# Patient Record
Sex: FEMALE | Race: WHITE | NOT HISPANIC OR LATINO | Employment: FULL TIME | URBAN - METROPOLITAN AREA
[De-identification: names, ages, dates, MRNs, and addresses within clinical notes are randomized per-mention and may not be internally consistent; named-entity substitution may affect disease eponyms.]

---

## 2022-04-10 ENCOUNTER — HISTORICAL (OUTPATIENT)
Dept: ADMINISTRATIVE | Facility: HOSPITAL | Age: 27
End: 2022-04-10

## 2022-04-25 VITALS
DIASTOLIC BLOOD PRESSURE: 84 MMHG | BODY MASS INDEX: 44.96 KG/M2 | HEIGHT: 66 IN | WEIGHT: 279.75 LBS | SYSTOLIC BLOOD PRESSURE: 120 MMHG

## 2022-11-10 ENCOUNTER — TELEPHONE (OUTPATIENT)
Dept: NEUROLOGY | Facility: CLINIC | Age: 27
End: 2022-11-10
Payer: COMMERCIAL

## 2022-11-10 DIAGNOSIS — G93.2 BENIGN INTRACRANIAL HYPERTENSION: Primary | ICD-10-CM

## 2022-11-10 NOTE — TELEPHONE ENCOUNTER
Pt reports her optometrist sent her to ER after speaking to on call MD when calling clinic after hours yesterday 11/9/22.  States she was sent to ER due to optic nerve in her eye.   Is asking if Dr. Venegas has any advice for her.       LOV: 11/25/2019    NOV: none  Notified MD's next available appt is out to January of 2023.  Notified this would be after 3 year sukh of not being seen and a referral would be needed to be seen as new pt if not seen by 11/25/2022.   Notified new pt availability out to February 2023.

## 2022-11-10 NOTE — TELEPHONE ENCOUNTER
Ladi Bernal of Pseudotumor cerebri, papilledema, ICP  Please work in as this concerns her vision  If no emergency spots avail, I believe MD was opening template 11/17 & 11/18 to accommodate additional patients

## 2022-11-11 ENCOUNTER — HOSPITAL ENCOUNTER (OUTPATIENT)
Dept: RADIOLOGY | Facility: HOSPITAL | Age: 27
Discharge: HOME | End: 2022-11-11
Attending: OPTOMETRIST
Payer: COMMERCIAL

## 2022-11-11 VITALS — HEART RATE: 66 BPM | SYSTOLIC BLOOD PRESSURE: 108 MMHG | DIASTOLIC BLOOD PRESSURE: 75 MMHG | OXYGEN SATURATION: 98 %

## 2022-11-11 DIAGNOSIS — G93.2 BENIGN INTRACRANIAL HYPERTENSION: ICD-10-CM

## 2022-11-11 LAB
POC PTINR: 1.1 (ref 0.9–1.2)
POC PTWBT: 13.3 SEC (ref 9.7–14.3)
SAMPLE: NORMAL

## 2022-11-11 PROCEDURE — 62329 THER SPI PNXR CSF FLUOR/CT: CPT

## 2022-11-11 NOTE — DISCHARGE INSTRUCTIONS
Report to ER with any new onset headache, light sensitivity, numbness/tingling in lower extremities or drainage from puncture site.

## 2022-11-16 VITALS
HEIGHT: 66 IN | SYSTOLIC BLOOD PRESSURE: 120 MMHG | BODY MASS INDEX: 44.96 KG/M2 | WEIGHT: 279.75 LBS | DIASTOLIC BLOOD PRESSURE: 84 MMHG

## 2022-11-16 RX ORDER — PREDNISONE 20 MG/1
20 TABLET ORAL DAILY
COMMUNITY
Start: 2022-11-01 | End: 2022-11-17

## 2022-11-16 RX ORDER — OXYCODONE AND ACETAMINOPHEN 5; 325 MG/1; MG/1
1-2 TABLET ORAL
COMMUNITY
End: 2022-11-17

## 2022-11-16 RX ORDER — ESOMEPRAZOLE MAGNESIUM 40 MG/1
1 CAPSULE, DELAYED RELEASE ORAL
COMMUNITY
End: 2022-11-17

## 2022-11-16 RX ORDER — IBUPROFEN 600 MG/1
TABLET ORAL
COMMUNITY
End: 2022-11-17

## 2022-11-16 RX ORDER — DIAZEPAM 5 MG/1
5 TABLET ORAL DAILY PRN
COMMUNITY
Start: 2022-11-10 | End: 2022-11-17

## 2022-11-16 RX ORDER — LACTULOSE 10 G/15ML
SOLUTION ORAL; RECTAL
COMMUNITY
End: 2022-11-17

## 2022-11-16 RX ORDER — HYDROXYZINE HYDROCHLORIDE 10 MG/1
1 TABLET, FILM COATED ORAL
COMMUNITY
End: 2022-11-17

## 2022-11-17 ENCOUNTER — OFFICE VISIT (OUTPATIENT)
Dept: NEUROLOGY | Facility: CLINIC | Age: 27
End: 2022-11-17
Payer: COMMERCIAL

## 2022-11-17 VITALS
BODY MASS INDEX: 44.84 KG/M2 | DIASTOLIC BLOOD PRESSURE: 98 MMHG | HEIGHT: 66 IN | HEART RATE: 74 BPM | SYSTOLIC BLOOD PRESSURE: 137 MMHG | WEIGHT: 279 LBS

## 2022-11-17 DIAGNOSIS — G93.2 PSEUDOTUMOR: Primary | ICD-10-CM

## 2022-11-17 DIAGNOSIS — G47.33 OSA (OBSTRUCTIVE SLEEP APNEA): ICD-10-CM

## 2022-11-17 PROCEDURE — 99215 PR OFFICE/OUTPT VISIT, EST, LEVL V, 40-54 MIN: ICD-10-PCS | Mod: S$GLB,,, | Performed by: PSYCHIATRY & NEUROLOGY

## 2022-11-17 PROCEDURE — 99215 OFFICE O/P EST HI 40 MIN: CPT | Mod: S$GLB,,, | Performed by: PSYCHIATRY & NEUROLOGY

## 2022-11-17 PROCEDURE — 99999 PR PBB SHADOW E&M-EST. PATIENT-LVL IV: CPT | Mod: PBBFAC,,, | Performed by: PSYCHIATRY & NEUROLOGY

## 2022-11-17 PROCEDURE — 3080F DIAST BP >= 90 MM HG: CPT | Mod: CPTII,S$GLB,, | Performed by: PSYCHIATRY & NEUROLOGY

## 2022-11-17 PROCEDURE — 1159F PR MEDICATION LIST DOCUMENTED IN MEDICAL RECORD: ICD-10-PCS | Mod: CPTII,S$GLB,, | Performed by: PSYCHIATRY & NEUROLOGY

## 2022-11-17 PROCEDURE — 3008F PR BODY MASS INDEX (BMI) DOCUMENTED: ICD-10-PCS | Mod: CPTII,S$GLB,, | Performed by: PSYCHIATRY & NEUROLOGY

## 2022-11-17 PROCEDURE — 3075F PR MOST RECENT SYSTOLIC BLOOD PRESS GE 130-139MM HG: ICD-10-PCS | Mod: CPTII,S$GLB,, | Performed by: PSYCHIATRY & NEUROLOGY

## 2022-11-17 PROCEDURE — 1159F MED LIST DOCD IN RCRD: CPT | Mod: CPTII,S$GLB,, | Performed by: PSYCHIATRY & NEUROLOGY

## 2022-11-17 PROCEDURE — 3080F PR MOST RECENT DIASTOLIC BLOOD PRESSURE >= 90 MM HG: ICD-10-PCS | Mod: CPTII,S$GLB,, | Performed by: PSYCHIATRY & NEUROLOGY

## 2022-11-17 PROCEDURE — 3075F SYST BP GE 130 - 139MM HG: CPT | Mod: CPTII,S$GLB,, | Performed by: PSYCHIATRY & NEUROLOGY

## 2022-11-17 PROCEDURE — 3008F BODY MASS INDEX DOCD: CPT | Mod: CPTII,S$GLB,, | Performed by: PSYCHIATRY & NEUROLOGY

## 2022-11-17 PROCEDURE — 99999 PR PBB SHADOW E&M-EST. PATIENT-LVL IV: ICD-10-PCS | Mod: PBBFAC,,, | Performed by: PSYCHIATRY & NEUROLOGY

## 2022-11-17 RX ORDER — TOPIRAMATE 50 MG/1
50 TABLET, FILM COATED ORAL NIGHTLY
Qty: 30 TABLET | Refills: 11 | Status: SHIPPED | OUTPATIENT
Start: 2022-11-17 | End: 2023-02-02

## 2022-11-17 RX ORDER — FUROSEMIDE 20 MG/1
20 TABLET ORAL DAILY
Qty: 30 TABLET | Refills: 11 | Status: SHIPPED | OUTPATIENT
Start: 2022-11-17 | End: 2022-12-21 | Stop reason: ALTCHOICE

## 2022-11-17 NOTE — PROGRESS NOTES
Subjective:       Patient ID: Perla Vu is a 27 y.o. female.    Chief Complaint: Pseudotumor Cerebri (Last seen 11/25/2019) and Papilledema (CT done 11/10/2022, LP 11/11/2022/C/o of pupils were two sizes last week)    HPI    Known pseudotumor  Last seen 2019  Sx remitted somewhat after delivery  Presnets with stable visoin sx; loss of L inf nasal field per ophth; worsening headaches over the last several weeks  Saw oph in [place]; we arranged LP last Friday; OP was 29; CP was 21; headache better but is coming back  Intolerant of diamox  Had BTL after her delivery  Review of Systems    The remainder of the 14 system ROS is noncontributory or negative unless mentioned/reviewed above.    Objective:      Physical Exam  L disc grade 2; R disc grade 1 edema  Mental Status: Alert and oriented x3. Language is fluent with good comprehension.    Cranial Nerve: Pupils are equal, round, and reactive to light. Visual fields are intact to confrontation. Normal fundi. Ocular movements are intact. Face is symmetric at rest and with activation with intact sensation throughout. Hearing intact to finger rub bilaterally. Muscles of tongue and palate activate symmetrically. No dysarthria. Strength is full in sternocleidomastoid and trapezius bilaterally.    Motor: Muscle bulk and tone are normal. Strength is 5/5 in all four extremities both proximally and distally. Intact fine motor movements bilaterally. There is no pronator drift or satelliting on arm roll.    Sensory: Sensation is intact to light touch, pinprick, vibration, and proprioception throughout. Romberg is negative.    Reflexes: 2+ and symmetric at the biceps, triceps, brachioradialis, patella, and Achilles bilaterally. Plantar response is flexor bilaterally.    Coordination: No dysmetria on finger-nose-finger or heel-knee-shin. Normal rapid alternating movements. Fast finger tapping with normal amplitude and speed.    Gait: Narrow based with normal stride length  and good arm swing bilaterally. Able to walk on heels, toes, and in tandem.    Assessment:       Problem List Items Addressed This Visit    None  Visit Diagnoses       Pseudotumor    -  Primary    Relevant Orders    Ambulatory referral/consult to Bariatric Surgery    Home Sleep Study    CATRACHO (obstructive sleep apnea)        Relevant Orders    Ambulatory referral/consult to Bariatric Surgery    Home Sleep Study              Plan:       Wt loss discussed at length; recc either optavia or diet/exercise; requests bariatric referral ; placed  HST ordered  Try lasix/topamax; no h/o glaucoma/kidney stone  Rtc 3-4 weeks

## 2022-11-22 ENCOUNTER — TELEPHONE (OUTPATIENT)
Dept: OPHTHALMOLOGY | Facility: CLINIC | Age: 27
End: 2022-11-22
Payer: COMMERCIAL

## 2022-11-22 NOTE — TELEPHONE ENCOUNTER
----- Message from Layla Musa sent at 11/22/2022  1:38 PM CST -----  Regarding: Referral  Contact: Lyric coelho is calling to found out status of referral.    [phone]

## 2022-11-22 NOTE — TELEPHONE ENCOUNTER
----- Message from Elsie Dickerson sent at 11/22/2022  3:12 PM CST -----  Patient is returning call to schedule appt.  Please contact her at [phone].

## 2022-12-21 ENCOUNTER — OFFICE VISIT (OUTPATIENT)
Dept: NEUROLOGY | Facility: CLINIC | Age: 27
End: 2022-12-21
Payer: COMMERCIAL

## 2022-12-21 VITALS
BODY MASS INDEX: 44.84 KG/M2 | SYSTOLIC BLOOD PRESSURE: 104 MMHG | HEIGHT: 66 IN | DIASTOLIC BLOOD PRESSURE: 64 MMHG | WEIGHT: 279 LBS

## 2022-12-21 DIAGNOSIS — G93.2 PSEUDOTUMOR: Primary | ICD-10-CM

## 2022-12-21 DIAGNOSIS — G47.33 OBSTRUCTIVE SLEEP APNEA: ICD-10-CM

## 2022-12-21 DIAGNOSIS — G43.909 MIGRAINE WITHOUT STATUS MIGRAINOSUS, NOT INTRACTABLE, UNSPECIFIED MIGRAINE TYPE: ICD-10-CM

## 2022-12-21 PROCEDURE — 3008F BODY MASS INDEX DOCD: CPT | Mod: CPTII,S$GLB,, | Performed by: PSYCHIATRY & NEUROLOGY

## 2022-12-21 PROCEDURE — 1159F PR MEDICATION LIST DOCUMENTED IN MEDICAL RECORD: ICD-10-PCS | Mod: CPTII,S$GLB,, | Performed by: PSYCHIATRY & NEUROLOGY

## 2022-12-21 PROCEDURE — 99999 PR PBB SHADOW E&M-EST. PATIENT-LVL III: ICD-10-PCS | Mod: PBBFAC,,, | Performed by: PSYCHIATRY & NEUROLOGY

## 2022-12-21 PROCEDURE — 3074F PR MOST RECENT SYSTOLIC BLOOD PRESSURE < 130 MM HG: ICD-10-PCS | Mod: CPTII,S$GLB,, | Performed by: PSYCHIATRY & NEUROLOGY

## 2022-12-21 PROCEDURE — 99214 PR OFFICE/OUTPT VISIT, EST, LEVL IV, 30-39 MIN: ICD-10-PCS | Mod: S$GLB,,, | Performed by: PSYCHIATRY & NEUROLOGY

## 2022-12-21 PROCEDURE — 99214 OFFICE O/P EST MOD 30 MIN: CPT | Mod: S$GLB,,, | Performed by: PSYCHIATRY & NEUROLOGY

## 2022-12-21 PROCEDURE — 1159F MED LIST DOCD IN RCRD: CPT | Mod: CPTII,S$GLB,, | Performed by: PSYCHIATRY & NEUROLOGY

## 2022-12-21 PROCEDURE — 99999 PR PBB SHADOW E&M-EST. PATIENT-LVL III: CPT | Mod: PBBFAC,,, | Performed by: PSYCHIATRY & NEUROLOGY

## 2022-12-21 PROCEDURE — 3078F PR MOST RECENT DIASTOLIC BLOOD PRESSURE < 80 MM HG: ICD-10-PCS | Mod: CPTII,S$GLB,, | Performed by: PSYCHIATRY & NEUROLOGY

## 2022-12-21 PROCEDURE — 3008F PR BODY MASS INDEX (BMI) DOCUMENTED: ICD-10-PCS | Mod: CPTII,S$GLB,, | Performed by: PSYCHIATRY & NEUROLOGY

## 2022-12-21 PROCEDURE — 3074F SYST BP LT 130 MM HG: CPT | Mod: CPTII,S$GLB,, | Performed by: PSYCHIATRY & NEUROLOGY

## 2022-12-21 PROCEDURE — 3078F DIAST BP <80 MM HG: CPT | Mod: CPTII,S$GLB,, | Performed by: PSYCHIATRY & NEUROLOGY

## 2022-12-21 RX ORDER — SUMATRIPTAN 50 MG/1
50 TABLET, FILM COATED ORAL
Qty: 9 TABLET | Refills: 11 | Status: SHIPPED | OUTPATIENT
Start: 2022-12-21 | End: 2023-01-20

## 2022-12-21 RX ORDER — ACETAZOLAMIDE 125 MG/1
125 TABLET ORAL DAILY
Qty: 30 TABLET | Refills: 11 | Status: SHIPPED | OUTPATIENT
Start: 2022-12-21 | End: 2023-02-02

## 2022-12-21 NOTE — PROGRESS NOTES
Subjective:       Patient ID: Perla Vu is a 27 y.o. female.    Chief Complaint: Pseudotumor Cerebri    HPI  Saw ophtho yesterday; edema is stable  Does not tolerate topamax  Lasix not really helping  Gets 2 migraines/week  No more constant headache  Review of Systems    The remainder of the 14 system ROS is noncontributory or negative unless mentioned/reviewed above.    Objective:      Physical Exam  grade 1-2 disc edema bilaterally  Mental Status: Alert and oriented x3. Language is fluent with good comprehension.    Cranial Nerve: Pupils are equal, round, and reactive to light. Visual fields are intact to confrontation. Normal fundi. Ocular movements are intact. Face is symmetric at rest and with activation with intact sensation throughout. Hearing intact to finger rub bilaterally. Muscles of tongue and palate activate symmetrically. No dysarthria. Strength is full in sternocleidomastoid and trapezius bilaterally.    Motor: Muscle bulk and tone are normal. Strength is 5/5 in all four extremities both proximally and distally. Intact fine motor movements bilaterally. There is no pronator drift or satelliting on arm roll.    Sensory: Sensation is intact to light touch, pinprick, vibration, and proprioception throughout. Romberg is negative.    Reflexes: 2+ and symmetric at the biceps, triceps, brachioradialis, patella, and Achilles bilaterally. Plantar response is flexor bilaterally.    Coordination: No dysmetria on finger-nose-finger or heel-knee-shin. Normal rapid alternating movements. Fast finger tapping with normal amplitude and speed.    Gait: Narrow based with normal stride length and good arm swing bilaterally. Able to walk on heels, toes, and in tandem.    Assessment:       Problem List Items Addressed This Visit    None  Visit Diagnoses       Pseudotumor    -  Primary    Migraine without status migrainosus, not intractable, unspecified migraine type        Obstructive sleep apnea        Relevant  Orders    Home Sleep Study              Plan:       Stop topamax  Restart diamox 125 mg/day  HST reordered (never heard back from prior visit order)  Sees bariatric surgery next month  Imitrex for the migraines

## 2023-02-01 NOTE — PROGRESS NOTES
"    Date:  2/2/2023    ?  Referring Provider:   Kole Guzman, OD    Copies of Letters to the Following:   Kole Guzman, OD    Chief Complaint:  I saw Perla Vu at the [facility] for neuro-ophthalmic evaluation.   She is a 27 y.o. female with a history of chronic headaches who presents for evaluation of suspected IIH..    History:     Diagnosed with IIH first during pregnancy in 2019. Neuroimaging obtained at [facility], can't recall which MRI. Started acetazolamide but discontinued after 2 weeks out of concern for possible effects on her pregnancy. Headaches recurred and she was again noted to have papilledema. She was started on topiramate but felt cognitively slow and this was discontinued. She was started on acetazolamide 125 mg daily about 2 months ago which she has tolerated well, but with some paresthesias and dysgeusia which have improved with time. She feels minimally improved but continues to have pressure like throbbing headaches over her vertex and occiput. She has pulsatile tinnitus, but no diplopia.      LP in 11/2022 with OP of 28 cm H2O    12/21/2022 Venegas (neuro):  "Stop topamax  Restart diamox 125 mg/day  HST reordered (never heard back from prior visit order)  Sees bariatric surgery next month  Imitrex for the migraines  "    11/17/2022 Venegas:  "Known pseudotumor  Last seen 2019  Sx remitted somewhat after delivery  Presnets with stable visoin sx; loss of L inf nasal field per ophth; worsening headaches over the last several weeks  Saw oph in [place]; we arranged LP last Friday; OP was 29; CP was 21; headache better but is coming back  Intolerant of diamox  Had BTL after her delivery"  ?  Current Outpatient Medications   Medication Sig Dispense Refill    acetaZOLAMIDE (DIAMOX) 250 MG tablet Take 2 tablets (500 mg total) by mouth 2 (two) times daily. 120 tablet 11    sumatriptan (IMITREX) 50 MG tablet Take 1 tablet (50 mg total) by mouth every 2 (two) hours " as needed for Migraine (max 200 mg/day). 9 tablet 11     No current facility-administered medications for this visit.     Review of patient's allergies indicates:   Allergen Reactions    Codeine Nausea And Vomiting     Past Medical History:   Diagnosis Date    Papilledema     Pseudotumor cerebri      Past Surgical History:   Procedure Laterality Date    ADENOIDECTOMY  1996     SECTION       SECTION       SECTION WITH TUBAL LIGATION      WISDOM TOOTH EXTRACTION  2010     Family History   Problem Relation Age of Onset    Aneurysm Paternal Grandfather      Social History     Socioeconomic History    Marital status:    Tobacco Use    Smoking status: Never    Smokeless tobacco: Never   Substance and Sexual Activity    Alcohol use: Yes    Drug use: Never       ?  Review of Systems:  Detailed review of systems was negative except as noted below.  Endocrine (hormone): Negative  Cardiovascular ( heart/blood vessels): Negative  Fevers/weight loss (constitutional):Negative  Ear, nose, or throat : Negative  Respiratory (lung): Negative  Gastrointestinal (stomach): Negative  Genitourinary (bladder/kidneys): Negative  Musculoskeletal (muscle/bones): Negative  Skin: Negative  Psychiatric: Negative  Hematologic (blood): Negative    Examination:  She was well-appearing. She was alert and oriented. Attention span and concentration were normal. Speech, language, memory, and general knowledge were intact.     Her distance visual acuity with correction was 20/20  in the right eye and 20/20  in the left eye.  Her near visual acuity with correction was J1+ in the right eye and J1+ in the left eye.     Visual fields were intact to confrontation. She perceived 8/8 OD and 8/8 OS Ishihara color plates correctly. Pupils were brisk to light without an afferent defect. Ocular ductions were full. Orthophoric in primary, right, and left gaze by cross cover. There was no nystagmus. Saccades and pursuits  were normal. Lids were symmetric.     Optic discs with mild disc margin blurring superiorly OD and more diffuse disc margin blurring OS. Pupillary dilation was not necessary for visualization of the optic disc today.     Laboratories Reviewed:     N/a  ?  Neuroimaging Reviewed:     N/a  ?  Ocular Imaging, Photos, Records Reviewed:     OCT RNFL Today 2/2/2023:   Right Eye - Average RNFL 102 all segments normal   Left Eye - Average RNFL 127 superior elevation     Normal macular architecture OU    Visual Field Test 24-2 OU Today 2/2/2023: Right Eye - fixation losses 0/11, false positives 5%, false negatives 0%, MD -2.44dB, Impression OD: few scattered mild points. Left Eye - fixation losses 1/10, false positives 0%, false negatives 2%, MD -2.51dB, Impression OS: mild BSE.  ?  Impression:  Perla Vu has history of chronic headaches who presents for evaluation of suspected IIH. LP with OP of 28 in 11/2022. They report pressure like headaches over the vertex with pulsatile tinnitus which has responded partially to very low dose acetazolamide. Neuro-ophthalmologic examination was notable for excellent visual acuities, full color vision, normal ocular motility and alignment. OCT with elevation OS>OD. Formal visual fields were notable for very mild blind spot enlargement OS>OD.  ?  Plan:  1. Increase acetazolamide to 500 mg BID  2. Follow up in neurology clinic as planned, limit frequency of tylenol  3. Follow up with Dr. Guzman as planned    Follow-up:  I will see her in follow-up in 3 months or sooner with any change.  ?OCT and HVF  ?  Visit Checklist (as applicable):  1. Status of new and prior symptoms discussed? yes  2. Neuroimaging reviewed/ ordered as appropriate? yes  3. Ocular imaging and photos reviewed/ ordered as appropriate? yes  4. Plan for work-up and treatment discussed with patient? yes  5. Potential medication side-effects and monitoring plan discussed? yes  6. Review of outside medical records was  performed and pertinent details are summarized in the HPI above? N/a    Time spent on this encounter: 60 minutes. This includes face to face time and non-face to face time preparing to see the patient (eg, review of tests), obtaining and/or reviewing separately obtained history, documenting clinical information in the electronic or other health record, independently interpreting results and communicating results to the patient/family/caregiver, or care coordinator.      NORMA Florez  Neuro-Ophthalmology Consultant

## 2023-02-02 ENCOUNTER — CLINICAL SUPPORT (OUTPATIENT)
Dept: OPHTHALMOLOGY | Facility: CLINIC | Age: 28
End: 2023-02-02
Payer: COMMERCIAL

## 2023-02-02 ENCOUNTER — OFFICE VISIT (OUTPATIENT)
Dept: OPHTHALMOLOGY | Facility: CLINIC | Age: 28
End: 2023-02-02
Payer: COMMERCIAL

## 2023-02-02 DIAGNOSIS — H53.15 VISUAL DISTORTIONS OF SHAPE AND SIZE: ICD-10-CM

## 2023-02-02 DIAGNOSIS — G93.2 IIH (IDIOPATHIC INTRACRANIAL HYPERTENSION): Primary | ICD-10-CM

## 2023-02-02 DIAGNOSIS — H47.10 PAPILLEDEMA: ICD-10-CM

## 2023-02-02 PROCEDURE — 99999 PR PBB SHADOW E&M-EST. PATIENT-LVL II: CPT | Mod: PBBFAC,,, | Performed by: STUDENT IN AN ORGANIZED HEALTH CARE EDUCATION/TRAINING PROGRAM

## 2023-02-02 PROCEDURE — 99205 OFFICE O/P NEW HI 60 MIN: CPT | Mod: S$GLB,,, | Performed by: STUDENT IN AN ORGANIZED HEALTH CARE EDUCATION/TRAINING PROGRAM

## 2023-02-02 PROCEDURE — 99999 PR PBB SHADOW E&M-EST. PATIENT-LVL II: ICD-10-PCS | Mod: PBBFAC,,, | Performed by: STUDENT IN AN ORGANIZED HEALTH CARE EDUCATION/TRAINING PROGRAM

## 2023-02-02 PROCEDURE — 92133 POSTERIOR SEGMENT OCT OPTIC NERVE(OCULAR COHERENCE TOMOGRAPHY) - OU - BOTH EYES: ICD-10-PCS | Mod: S$GLB,,, | Performed by: STUDENT IN AN ORGANIZED HEALTH CARE EDUCATION/TRAINING PROGRAM

## 2023-02-02 PROCEDURE — 99205 PR OFFICE/OUTPT VISIT, NEW, LEVL V, 60-74 MIN: ICD-10-PCS | Mod: S$GLB,,, | Performed by: STUDENT IN AN ORGANIZED HEALTH CARE EDUCATION/TRAINING PROGRAM

## 2023-02-02 PROCEDURE — 92083 HUMPHREY VISUAL FIELD - OU - BOTH EYES: ICD-10-PCS | Mod: S$GLB,,, | Performed by: STUDENT IN AN ORGANIZED HEALTH CARE EDUCATION/TRAINING PROGRAM

## 2023-02-02 PROCEDURE — 92133 CPTRZD OPH DX IMG PST SGM ON: CPT | Mod: S$GLB,,, | Performed by: STUDENT IN AN ORGANIZED HEALTH CARE EDUCATION/TRAINING PROGRAM

## 2023-02-02 PROCEDURE — 1159F MED LIST DOCD IN RCRD: CPT | Mod: CPTII,S$GLB,, | Performed by: STUDENT IN AN ORGANIZED HEALTH CARE EDUCATION/TRAINING PROGRAM

## 2023-02-02 PROCEDURE — 92083 EXTENDED VISUAL FIELD XM: CPT | Mod: S$GLB,,, | Performed by: STUDENT IN AN ORGANIZED HEALTH CARE EDUCATION/TRAINING PROGRAM

## 2023-02-02 PROCEDURE — 1159F PR MEDICATION LIST DOCUMENTED IN MEDICAL RECORD: ICD-10-PCS | Mod: CPTII,S$GLB,, | Performed by: STUDENT IN AN ORGANIZED HEALTH CARE EDUCATION/TRAINING PROGRAM

## 2023-02-02 RX ORDER — ACETAZOLAMIDE 250 MG/1
500 TABLET ORAL 2 TIMES DAILY
Qty: 120 TABLET | Refills: 11 | Status: SHIPPED | OUTPATIENT
Start: 2023-02-02 | End: 2024-02-02

## 2023-02-02 NOTE — LETTER
[facility] [address]  [address]  [facility] [address]  Phone: [phone]  Fax: [phone]   February 2, 2023    Kole Guzman, OD  [address] [facility]  [place] [address]    Patient: Perla Vu   MR Number: [id number]   YOB: 1995   Date of Visit: 2/2/2023       Dear Dr. Guzman :    Thank you for referring Perla Vu to me for evaluation. Here is my assessment and plan of care:    Impression:  Perla Vu has history of chronic headaches who presents for evaluation of suspected IIH. LP with OP of 28 in 11/2022. They report pressure like headaches over the vertex with pulsatile tinnitus which has responded partially to very low dose acetazolamide. Neuro-ophthalmologic examination was notable for excellent visual acuities, full color vision, normal ocular motility and alignment. OCT with elevation OS>OD. Formal visual fields were notable for very mild blind spot enlargement OS>OD.     Plan:  1. Increase acetazolamide to 500 mg BID  2. Follow up in neurology clinic as planned, limit frequency of tylenol  3. Follow up with Dr. Guzman as planned    Follow-up:  I will see her in follow-up in 3 months or sooner with any change.    If you have questions, please do not hesitate to call me. I look forward to following Ms. Perla Vu along with you.    Sincerely,        MD ELISABETH Matos MD

## 2023-03-01 ENCOUNTER — TELEPHONE (OUTPATIENT)
Dept: OPHTHALMOLOGY | Facility: CLINIC | Age: 28
End: 2023-03-01
Payer: COMMERCIAL

## 2023-03-01 NOTE — TELEPHONE ENCOUNTER
Spoke with Dr. Hercules and Pt. Pt will continue with acetazolamide and monitor if symptoms worsen. Call the clinic for any changes.

## 2023-03-01 NOTE — TELEPHONE ENCOUNTER
----- Message from Tina Hercules MD sent at 3/1/2023  2:09 PM CST -----  Regarding: RE: Pt Advice  Let's have her stop the acetazolamide and start furosemide 20 mg daily. She will likely have to urinate a lot as a side effect, but it is another option to get the pressure down. If she's in agreement, I will send to her pharmacy.     RC  ----- Message -----  From: Sonal Rodriguez  Sent: 3/1/2023  12:57 PM CST  To: Tina Hercules MD  Subject: FW: Pt Advice                                    ?   ----- Message -----  From: Lowell Robles  Sent: 3/1/2023  12:23 PM CST  To: Diomedes Mendez  Subject: Pt Advice                                        Pt called stating that she would like to speak with someone in the office in regards to blisters on the inside of her lip that she think is from the acetaZOLAMIDE (DIAMOX) 250 MG tablet         Contact Perla [phone] or Oleksandr  [phone] if pt don't answer

## 2023-03-30 ENCOUNTER — TELEPHONE (OUTPATIENT)
Dept: OPHTHALMOLOGY | Facility: CLINIC | Age: 28
End: 2023-03-30
Payer: COMMERCIAL

## 2023-03-30 NOTE — TELEPHONE ENCOUNTER
----- Message from Enma Moreland sent at 3/30/2023 12:18 PM CDT -----  Regarding: Requesting call back  Patient called about speaking to office about having trouble swallowing and recently almost choking on her food. Pt states she is taking acetaZOLAMIDE (DIAMOX) 250 MG tablet    Requesting Call back number:[phone]

## 2023-03-30 NOTE — TELEPHONE ENCOUNTER
Spoke with pt to decreased diamox by 500 mg. She is currently taking 1,000 mg. She is now to take. (2) 250 mg = 500 mg

## 2023-03-30 NOTE — TELEPHONE ENCOUNTER
----- Message from Tina Hercules MD sent at 3/30/2023  1:51 PM CDT -----  Regarding: RE: Requesting call back  She can try going back down to her previous dose and seeing if the symptoms resolve.    RC  ----- Message -----  From: Sonal Rodriguez  Sent: 3/30/2023   1:18 PM CDT  To: Tina Hercules MD  Subject: FW: Requesting call back                         Spoke with pt, and stated that PCP told her to contact you. She thinks Diamox is causing symptoms, that she can recalls has been going on for 3-4 weeks.   ----- Message -----  From: Enma Moreland  Sent: 3/30/2023  12:20 PM CDT  To: Diomedes VELA Staff  Subject: Requesting call back                             Patient called about speaking to office about having trouble swallowing and recently almost choking on her food. Pt states she is taking acetaZOLAMIDE (DIAMOX) 250 MG tablet    Requesting Call back number:[phone]

## 2023-05-24 ENCOUNTER — OFFICE VISIT (OUTPATIENT)
Dept: OPHTHALMOLOGY | Facility: CLINIC | Age: 28
End: 2023-05-24
Payer: COMMERCIAL

## 2023-05-24 ENCOUNTER — CLINICAL SUPPORT (OUTPATIENT)
Dept: OPHTHALMOLOGY | Facility: CLINIC | Age: 28
End: 2023-05-24
Payer: COMMERCIAL

## 2023-05-24 DIAGNOSIS — G93.2 IIH (IDIOPATHIC INTRACRANIAL HYPERTENSION): Primary | ICD-10-CM

## 2023-05-24 DIAGNOSIS — R51.9 NONINTRACTABLE HEADACHE, UNSPECIFIED CHRONICITY PATTERN, UNSPECIFIED HEADACHE TYPE: ICD-10-CM

## 2023-05-24 DIAGNOSIS — H53.15 VISUAL DISTORTIONS OF SHAPE AND SIZE: ICD-10-CM

## 2023-05-24 DIAGNOSIS — E66.9 OBESITY, UNSPECIFIED CLASSIFICATION, UNSPECIFIED OBESITY TYPE, UNSPECIFIED WHETHER SERIOUS COMORBIDITY PRESENT: ICD-10-CM

## 2023-05-24 PROCEDURE — 99215 OFFICE O/P EST HI 40 MIN: CPT | Mod: S$GLB,,, | Performed by: STUDENT IN AN ORGANIZED HEALTH CARE EDUCATION/TRAINING PROGRAM

## 2023-05-24 PROCEDURE — 1160F RVW MEDS BY RX/DR IN RCRD: CPT | Mod: CPTII,S$GLB,, | Performed by: STUDENT IN AN ORGANIZED HEALTH CARE EDUCATION/TRAINING PROGRAM

## 2023-05-24 PROCEDURE — 1159F PR MEDICATION LIST DOCUMENTED IN MEDICAL RECORD: ICD-10-PCS | Mod: CPTII,S$GLB,, | Performed by: STUDENT IN AN ORGANIZED HEALTH CARE EDUCATION/TRAINING PROGRAM

## 2023-05-24 PROCEDURE — 99999 PR PBB SHADOW E&M-EST. PATIENT-LVL III: ICD-10-PCS | Mod: PBBFAC,,, | Performed by: STUDENT IN AN ORGANIZED HEALTH CARE EDUCATION/TRAINING PROGRAM

## 2023-05-24 PROCEDURE — 1160F PR REVIEW ALL MEDS BY PRESCRIBER/CLIN PHARMACIST DOCUMENTED: ICD-10-PCS | Mod: CPTII,S$GLB,, | Performed by: STUDENT IN AN ORGANIZED HEALTH CARE EDUCATION/TRAINING PROGRAM

## 2023-05-24 PROCEDURE — 92133 POSTERIOR SEGMENT OCT OPTIC NERVE(OCULAR COHERENCE TOMOGRAPHY) - OU - BOTH EYES: ICD-10-PCS | Mod: S$GLB,,, | Performed by: STUDENT IN AN ORGANIZED HEALTH CARE EDUCATION/TRAINING PROGRAM

## 2023-05-24 PROCEDURE — 92083 EXTENDED VISUAL FIELD XM: CPT | Mod: S$GLB,,, | Performed by: STUDENT IN AN ORGANIZED HEALTH CARE EDUCATION/TRAINING PROGRAM

## 2023-05-24 PROCEDURE — 1159F MED LIST DOCD IN RCRD: CPT | Mod: CPTII,S$GLB,, | Performed by: STUDENT IN AN ORGANIZED HEALTH CARE EDUCATION/TRAINING PROGRAM

## 2023-05-24 PROCEDURE — 99215 PR OFFICE/OUTPT VISIT, EST, LEVL V, 40-54 MIN: ICD-10-PCS | Mod: S$GLB,,, | Performed by: STUDENT IN AN ORGANIZED HEALTH CARE EDUCATION/TRAINING PROGRAM

## 2023-05-24 PROCEDURE — 92133 CPTRZD OPH DX IMG PST SGM ON: CPT | Mod: S$GLB,,, | Performed by: STUDENT IN AN ORGANIZED HEALTH CARE EDUCATION/TRAINING PROGRAM

## 2023-05-24 PROCEDURE — 92083 HUMPHREY VISUAL FIELD - OU - BOTH EYES: ICD-10-PCS | Mod: S$GLB,,, | Performed by: STUDENT IN AN ORGANIZED HEALTH CARE EDUCATION/TRAINING PROGRAM

## 2023-05-24 PROCEDURE — 99999 PR PBB SHADOW E&M-EST. PATIENT-LVL III: CPT | Mod: PBBFAC,,, | Performed by: STUDENT IN AN ORGANIZED HEALTH CARE EDUCATION/TRAINING PROGRAM

## 2023-05-24 NOTE — PROGRESS NOTES
"      Date:  5/24/2023    ?  Referring Provider:   No ref. provider found    Copies of Letters to the Following:   No ref. provider found    Chief Complaint:  I saw Perla Vu at the [facility] for neuro-ophthalmic evaluation.   She is a 27 y.o. female with a history of chronic headaches who presents for follow up of IIH.    History:     Diagnosed with IIH first during pregnancy in 2019. Neuroimaging obtained at [facility], can't recall which MRI. Started acetazolamide but discontinued after 2 weeks out of concern for possible effects on her pregnancy. Headaches recurred and she was again noted to have papilledema. She was started on topiramate but felt cognitively slow and this was discontinued. She was started on acetazolamide 125 mg daily about 2 months ago which she has tolerated well, but with some paresthesias and dysgeusia which have improved with time. She feels minimally improved but continues to have pressure like throbbing headaches over her vertex and occiput. She has pulsatile tinnitus, but no diplopia.      LP in 11/2022 with OP of 28 cm H2O    12/21/2022 Venegas (neuro):  "Stop topamax  Restart diamox 125 mg/day  HST reordered (never heard back from prior visit order)  Sees bariatric surgery next month  Imitrex for the migraines  "    11/17/2022 Venegas:  "Known pseudotumor  Last seen 2019  Sx remitted somewhat after delivery  Presnets with stable visoin sx; loss of L inf nasal field per ophth; worsening headaches over the last several weeks  Saw oph in [place]; we arranged LP last Friday; OP was 29; CP was 21; headache better but is coming back  Intolerant of diamox  Had BTL after her delivery"  ?  Interval History: 5/24/2023    Had some issues with dysphagia and lip blisters and was concerned for reaction to acetazolamide. She decreased dose to 250 mg BID from 500 mg BID but had worsening of headaches. Increased back to 500 mg BID with improvement in headaches. She is " still having a lot of fatigue which she attributes to the acetazolamide. Would like referral to weight management for assistance with weight loss goals.    HPI    DSL- 2023 Dr. Hercules    28 y/o female present to clinic for IIH f/u with HVF/OCT review. Pt states   she increased acetazolamide to 500 mg BID, she is tolerating well. She   still has weekly headaches.  She states around 2:00 p.m, she feels   uncomfortable-  migraines-like symptoms occurring. Feels better when   hydrated and light are turned off . She loss 12 lbs since last visit. No   vision loss/changes since last visit. Dry eyes improved since last visit.    Eyemeds  At's OU PRN    Last edited by Sonal Rodriguez on 2023  2:46 PM.          Current Outpatient Medications   Medication Sig Dispense Refill    acetaZOLAMIDE (DIAMOX) 250 MG tablet Take 2 tablets (500 mg total) by mouth 2 (two) times daily. 120 tablet 11    sumatriptan (IMITREX) 50 MG tablet Take 1 tablet (50 mg total) by mouth every 2 (two) hours as needed for Migraine (max 200 mg/day). 9 tablet 11     No current facility-administered medications for this visit.     Review of patient's allergies indicates:   Allergen Reactions    Codeine Nausea And Vomiting     Past Medical History:   Diagnosis Date    Papilledema     Pseudotumor cerebri      Past Surgical History:   Procedure Laterality Date    ADENOIDECTOMY  1996     SECTION       SECTION       SECTION WITH TUBAL LIGATION      WISDOM TOOTH EXTRACTION       Family History   Problem Relation Age of Onset    Aneurysm Paternal Grandfather      Social History     Socioeconomic History    Marital status:    Tobacco Use    Smoking status: Never    Smokeless tobacco: Never   Substance and Sexual Activity    Alcohol use: Yes    Drug use: Never       Examination:  She was well-appearing. She was alert and oriented. Attention span and concentration were normal. Speech, language, memory, and  general knowledge were intact.     Her distance visual acuity with correction was 20/20  in the right eye and 20/20  in the left eye.  Her near visual acuity with correction was J1 in the right eye and J1 in the left eye.     Visual fields were intact to confrontation. She perceived 8/8 OD and 8/8 OS Ishihara color plates correctly. Pupils were brisk to light without an afferent defect. Ocular ductions were full. Orthophoric in primary, right, and left gaze by cross cover. There was no nystagmus. Saccades and pursuits were normal. Lids were symmetric.     Optic discs with mild disc margin blurring superiorly OD and more diffuse disc margin blurring OS. Pupillary dilation was not necessary for visualization of the optic disc today.     Laboratories Reviewed:     N/a  ?  Neuroimaging Reviewed:     N/a  ?  Ocular Imaging, Photos, Records Reviewed:     OCT RNFL  2/2/2023:   Right Eye - Average RNFL 102 all segments normal   Left Eye - Average RNFL 127 superior elevation     Normal macular architecture OU    OCT RNFL Today 5/24/2023:   Right Eye - Average RNFL 101 all segments normal   Left Eye - Average RNFL 133 stable pattern accounting for some line artifact     Macular architecture normal OU    Visual Field Test 24-2 OU  2/2/2023: Right Eye - fixation losses 0/11, false positives 5%, false negatives 0%, MD -2.44dB, Impression OD: few scattered mild points. Left Eye - fixation losses 1/10, false positives 0%, false negatives 2%, MD -2.51dB, Impression OS: mild BSE.  ?  Visual Field Test 24-2 OU Today 5/24/2023: Right Eye - fixation losses 0/11, false positives 9%, false negatives 0%, MD -0.62dB, Impression OD: full. Left Eye - fixation losses 1/10, false positives 0%, false negatives 0%, MD -1.54dB, Impression OS: full.      Impression:  Perla Vu has history of chronic headaches who presents for evaluation of suspected IIH. LP with OP of 28 in 11/2022. They report pressure like headaches over the vertex with  pulsatile tinnitus which has responded partially to very low dose acetazolamide. Neuro-ophthalmologic examination was notable for excellent visual acuities, full color vision, normal ocular motility and alignment. OCT with elevation OS>OD. Formal visual fields were notable for very mild blind spot enlargement OS>OD.    5/24/2023: Had some issues with dysphagia and lip blisters and was concerned for reaction to acetazolamide. She decreased dose to 250 mg BID from 500 mg BID but had worsening of headaches. Increased back to 500 mg BID with improvement in headaches. She is still having a lot of fatigue which she attributes to the acetazolamide. Would like referral to weight management for assistance with weight loss goals. OCT stable. Formal visual fields with some improvement. Still having frequent headaches with photophobia. Needs a neurologist for optimal management of migraines. Could consider MRV at any point, if unable to tolerate medication and looking to pursue more invasive approach.   ?  Plan:  1. Continue acetazolamide to 500 mg BID  2. Follow up in neurology clinic for headaches and limit frequency of tylenol  3. Follow up with Dr. Guzman as planned  4. Referral placed to weight loss services    Follow-up:  I will see her in follow-up in 1/2024 or sooner with any change.  ?OCT only  ?  Visit Checklist (as applicable):  1. Status of new and prior symptoms discussed? yes  2. Neuroimaging reviewed/ ordered as appropriate? yes  3. Ocular imaging and photos reviewed/ ordered as appropriate? yes  4. Plan for work-up and treatment discussed with patient? yes  5. Potential medication side-effects and monitoring plan discussed? yes  6. Review of outside medical records was performed and pertinent details are summarized in the HPI above? N/a    Time spent on this encounter: 45 minutes. This includes face to face time and non-face to face time preparing to see the patient (eg, review of tests), obtaining and/or  reviewing separately obtained history, documenting clinical information in the electronic or other health record, independently interpreting results and communicating results to the patient/family/caregiver, or care coordinator.      NORMA Florez  Neuro-Ophthalmology Consultant

## 2023-07-25 ENCOUNTER — TELEPHONE (OUTPATIENT)
Dept: NEUROLOGY | Facility: CLINIC | Age: 28
End: 2023-07-25
Payer: COMMERCIAL

## 2023-07-25 NOTE — TELEPHONE ENCOUNTER
----- Message from Lulu Lai RN sent at 7/25/2023  3:00 PM CDT -----  Regarding: headache  This is a very old message but I'm not sure if pt still needs to be seen   ----- Message -----  From: Sonal Rodriguez  Sent: 5/25/2023   8:14 AM CDT  To: , #     Good morning, please schedule pt for Nonintractable headache per Dr. Hercules. Thanks.

## 2023-08-24 ENCOUNTER — TELEPHONE (OUTPATIENT)
Dept: BARIATRICS | Facility: CLINIC | Age: 28
End: 2023-08-24
Payer: COMMERCIAL

## 2024-09-05 ENCOUNTER — TELEPHONE (OUTPATIENT)
Dept: SURGERY | Facility: CLINIC | Age: 29
End: 2024-09-05
Payer: COMMERCIAL

## 2024-09-23 NOTE — PROGRESS NOTES
"HISTORY & PHYSICAL  Bariatric Consultative Note  General Surgery    Patient Name: Perla Vu  YOB: 1995    Date: 2024                   SUBJECTIVE:     Chief Complaint/Reason for Admission:   Chief Complaint   Patient presents with    Consult     Interested in weight loss surgery          History of Present Illness:    Perla Vu is a 28 y.o. year old female, 5'4",  266 lbs., (Body mass index is 45.66 kg/m².).   She has been more than 100 lbs. overweight for the past 11+ and is currently at She greatest weight.  She has tried numerous weight loss progrmas including:  Exercise, Calorie Counting, self-directed and physician supervised diets and has lost up to 20-30 lbs. on more than one occasion only to regain the weight.     The patient is not a diabetic.  She is not an asthma and denies obstructive sleep apnea. She  denies weight bearing joint pain, but suffers from chronic low back pain. She denies kidney / urinary tract disease but has occasional headaches with migraines, and denies dizziness, seizure or neurological disorders. She denies thyroid disease, adrenal, pituitary disease, but suffers from anxiety but denies depression or psychiatric disorder. The patient does not have a history of gallstones. She has occasional heartburn but denies ulcer or liver disease. She  denies hypertension, high cholesterol, heart attack or stroke. She denies anemia, bleeding disorder, thrombosis, clotting disorder or easy bruisability. She denies peripheral edema.    Surgeries -  section x3    Review of Systems:  12 point ROS negative except as stated in HPI    PAST HISTORY:     Past Medical History:   Diagnosis Date    Allergy     Seasonal allergies and skin allergies    Anxiety     Controlled    Asthma     GERD (gastroesophageal reflux disease)     Papilledema     Pseudotumor cerebri      Past Surgical History:   Procedure Laterality Date    ADENOIDECTOMY      "  SECTION  2015     SECTION       SECTION WITH TUBAL LIGATION      TUBAL LIGATION      WISDOM TOOTH EXTRACTION  2010     Family History   Problem Relation Name Age of Onset    Aneurysm Paternal Grandfather Grandfather     Obesity Paternal Grandfather Grandfather     Hypertension Mother Mona     Obesity Father Gene     Stroke Maternal Grandfather Kaiden     Cancer Maternal Grandmother Isabel     Obesity Maternal Grandmother Isabel      Social History     Socioeconomic History    Marital status:    Tobacco Use    Smoking status: Never    Smokeless tobacco: Never   Substance and Sexual Activity    Alcohol use: Not Currently    Drug use: Never    Sexual activity: Yes     Partners: Male     Birth control/protection: Other-see comments, None     Comment: Tubes tied     Social Determinants of Health     Financial Resource Strain: Low Risk  (2024)    Overall Financial Resource Strain (CARDIA)     Difficulty of Paying Living Expenses: Not hard at all   Food Insecurity: No Food Insecurity (2024)    Hunger Vital Sign     Worried About Running Out of Food in the Last Year: Never true     Ran Out of Food in the Last Year: Never true   Physical Activity: Insufficiently Active (2024)    Exercise Vital Sign     Days of Exercise per Week: 3 days     Minutes of Exercise per Session: 20 min   Stress: Stress Concern Present (2024)    [employer] - Occupational Stress Questionnaire     Feeling of Stress : To some extent   Housing Stability: Unknown (2024)    Housing Stability Vital Sign     Unable to Pay for Housing in the Last Year: No       MEDICATIONS & ALLERGIES:     Current Outpatient Medications on File Prior to Visit   Medication Sig    escitalopram oxalate (LEXAPRO ORAL)     esomeprazole (NEXIUM) 20 MG capsule     tirzepatide (MOUNJARO) 2.5 mg/0.5 mL PnIj Inject 2.5 mg into the skin every 7 days.    [DISCONTINUED] acetaZOLAMIDE (DIAMOX)  "250 MG tablet Take 2 tablets (500 mg total) by mouth 2 (two) times daily.    [DISCONTINUED] sumatriptan (IMITREX) 50 MG tablet Take 1 tablet (50 mg total) by mouth every 2 (two) hours as needed for Migraine (max 200 mg/day).     No current facility-administered medications on file prior to visit.     Review of patient's allergies indicates:   Allergen Reactions    Codeine Nausea And Vomiting       OBJECTIVE:   Visit Vitals  /80   Pulse 91   Ht 5' 4" (1.626 m)   Wt 120.7 kg (266 lb)   BMI 45.66 kg/m²       Physical Exam:  General:  Well developed, well nourished, no acute distress  HEENT:  Normocephalic, atraumatic, PERRL, EOMI, clear sclera, ears normal, neck supple, throat clear without erythema or exudates  CVS:  RRR, S1 and S2 normal, no murmurs, rubs, gallops  Resp:  Lungs clear to auscultation, no wheezes, rales, rhonchi, cough  GI:  Abdomen soft, non-tender, non-distended, normoactive bowel sounds, no masses  :  Deferred  MSK:  No muscle atrophy, cyanosis, peripheral edema, full range of motion  Skin:  No rashes, ulcers, erythema  Neuro:  CNII-XII grossly intact  Psych:  Alert and oriented to person, place, and time    Results:  I have independently reviewed all pertinent lab and radiologic studies relevant to general/bariatric surgery.      VISIT DIAGNOSES:       ICD-10-CM ICD-9-CM   1. Chronic bilateral low back pain, unspecified whether sciatica present  M54.50 724.2    G89.29 338.29   2. GERD with apnea  K21.9 530.81    R06.81 786.03   3. Anxiety  F41.9 300.00   4. Intracranial hypertension  G93.2 348.2       ASSESSMENT/PLAN:     The patient is a morbidly obese female with a Body mass index is 45.66 kg/m². with significant weight related co-morbidity including:  chronic low back pain, gastroesophageal reflux disease, migraines and anxiety.  She has been unable to lose her weight and improve her co-morbid conditions with medical management including diet and exercise.      RECOMMENDATION: Weight " loss surgery. The risks, benefits and alternatives, including laparoscopic gastric bypass, laparoscopic vertical sleeve gastrectomy and laparoscopic gastric banding were discussed at length and all of her questions were answered. The patient decided to undergo a Vertical Sleeve Gastrectomy. The patient appears to understand and wishes to proceed.      The patient was given the following instructions:  She needs a complete medical evaluation.  She needs a serologic test for H.Pylori and if positive will need an upper endoscopy.  She needs dietary and psychological evaluations.  She must attend a preoperative support group meeting.    The patient clearly understood that surgery would only be scheduled if there are no medical or psychiatric contraindications and a second office visit is required.

## 2024-09-25 ENCOUNTER — CLINICAL SUPPORT (OUTPATIENT)
Dept: BARIATRICS | Facility: HOSPITAL | Age: 29
End: 2024-09-25
Payer: COMMERCIAL

## 2024-09-25 ENCOUNTER — OFFICE VISIT (OUTPATIENT)
Dept: SURGERY | Facility: CLINIC | Age: 29
End: 2024-09-25
Payer: COMMERCIAL

## 2024-09-25 VITALS
HEART RATE: 91 BPM | HEIGHT: 64 IN | WEIGHT: 266 LBS | DIASTOLIC BLOOD PRESSURE: 80 MMHG | SYSTOLIC BLOOD PRESSURE: 112 MMHG | BODY MASS INDEX: 45.41 KG/M2

## 2024-09-25 VITALS
WEIGHT: 266 LBS | BODY MASS INDEX: 45.41 KG/M2 | HEIGHT: 64 IN | BODY MASS INDEX: 45.41 KG/M2 | HEIGHT: 64 IN | WEIGHT: 266 LBS

## 2024-09-25 DIAGNOSIS — K21.9 GERD WITH APNEA: ICD-10-CM

## 2024-09-25 DIAGNOSIS — F41.9 ANXIETY: ICD-10-CM

## 2024-09-25 DIAGNOSIS — G93.2 INTRACRANIAL HYPERTENSION: ICD-10-CM

## 2024-09-25 DIAGNOSIS — G89.29 CHRONIC BILATERAL LOW BACK PAIN, UNSPECIFIED WHETHER SCIATICA PRESENT: Primary | ICD-10-CM

## 2024-09-25 DIAGNOSIS — M54.50 CHRONIC BILATERAL LOW BACK PAIN, UNSPECIFIED WHETHER SCIATICA PRESENT: Primary | ICD-10-CM

## 2024-09-25 DIAGNOSIS — E66.01 MORBID OBESITY WITH BMI OF 45.0-49.9, ADULT: Primary | ICD-10-CM

## 2024-09-25 DIAGNOSIS — R06.81 GERD WITH APNEA: ICD-10-CM

## 2024-09-25 DIAGNOSIS — E66.9 OBESITY: Primary | ICD-10-CM

## 2024-09-25 PROCEDURE — 3079F DIAST BP 80-89 MM HG: CPT | Mod: CPTII,,, | Performed by: SURGERY

## 2024-09-25 PROCEDURE — 3074F SYST BP LT 130 MM HG: CPT | Mod: CPTII,,, | Performed by: SURGERY

## 2024-09-25 PROCEDURE — 99204 OFFICE O/P NEW MOD 45 MIN: CPT | Mod: ,,, | Performed by: SURGERY

## 2024-09-25 PROCEDURE — 3008F BODY MASS INDEX DOCD: CPT | Mod: CPTII,,, | Performed by: SURGERY

## 2024-09-25 PROCEDURE — 1160F RVW MEDS BY RX/DR IN RCRD: CPT | Mod: CPTII,,, | Performed by: SURGERY

## 2024-09-25 PROCEDURE — 1159F MED LIST DOCD IN RCRD: CPT | Mod: CPTII,,, | Performed by: SURGERY

## 2024-09-25 RX ORDER — TIRZEPATIDE 2.5 MG/.5ML
2.5 INJECTION, SOLUTION SUBCUTANEOUS
COMMUNITY

## 2024-09-25 RX ORDER — HYDROGEN PEROXIDE 3 %
SOLUTION, NON-ORAL MISCELLANEOUS
COMMUNITY
Start: 2024-05-25

## 2024-09-25 NOTE — PROGRESS NOTES
Dr. Whitehead   Bariatric Consultation Form      Surgery: [x]VSG  []RNY  []DS  []Revision/Conversion to:      Labs:     Clearance:  [] H.Pylori Serology   [] Cardiology:  [] TSH    [] Pulmonary :  [] Hgb A1c    [] Medical:  [] Other:    [] Other:  [] Obtain Lab Results:      Testing:  [x] EGD (pre op)    [x] Obtain  (Dr. Julia Zhao)    [] Refer  [x] UGI  [] Sleep Study  [] U/S Liver  [] Other:    Additional Notes:

## 2024-09-25 NOTE — PROGRESS NOTES
"NUTRITIONAL CONSULT    Initial assessment for  TBD  Non Surgical: []    PAST HISTORY:   Dieting attempts include weight watchers, Keto , and Diet supplements/ medication Mounjaro- on currently, lost 30# so far  Highest adult weight: 299#  How many years at present wt: 3-5 months  Greatest single wt loss:35#    CLINICAL DATA:  Height:   Ht Readings from Last 1 Encounters:   09/25/24 5' 4" (1.626 m)      Weight:   Wt Readings from Last 3 Encounters:   09/25/24 1259 120.7 kg (266 lb)   09/25/24 1250 120.7 kg (266 lb)   12/21/22 1442 126.6 kg (279 lb)      IBW: 120 lbs   BMI:   BMI Readings from Last 1 Encounters:   09/25/24 45.66 kg/m²      Bariatric goal weight (125% EBW): 150 lbs  Patient's goal weight: 200 lbs    FAMILY HISTORY OF OBESITY:  Yes           WHAT SIDE OF THE FAMILY?   []Mother   []Father   []Sibling   []Child     [x]Extended    []Adopted       Goal for Bariatric Surgery: to lose weight, to prevent future medical conditions, and feel better, less tired. Prevent brain shunt placement for intracranial  hypotension.    NUTRITION & HEALTH HISTORY:  Greatest challenge:  frequent hunger.      Current Dietary Patterns: (recent changes with Mounjaro)  Meal pattern: 2 small and 1 larger meal  Snacking: 3 / day Type: yogurt, fruit, protein shake, PB crackers  Vegetables: Likes a variety. Eats daily.  Fruits: Likes a variety. Eats almost daily.  Beverages: water, diet soda, and sugar-free beverages  Alcohol consumption: Monthly. Type: once per month  Dining out: Weekly. Mostly restaurants. 2-3 x week  Grocery shopping and food prep: Yes[x]Self   []Spouse   []Other:   Emotional eating: Yes Which emotions if yes: stress, sad, comfort  Nighttime snacking: No Middle of night: No Before bedtime: No  Hx of disordered eating behaviors: No Anorexia: No Bulimia: No Purging: No Binging:No  History of vitamin/mineral deficiencies:   Yes Vit B12 when younger; before having children    Vitamins / Minerals / Herbs: "   none    Food Allergies:   NKFA      ASSESSMENT:  Patient reports attempts at weight loss, only to regain lost weight.  Patient demonstrated knowledge of healthy eating behaviors and exercise patterns; admits to not eating healthy and not exercising at this point.  Patient states willingness to change lifestyle and make behavior modifications.  Expect good  compliance after surgery at this time.        ESTIMATED NEEDS:  Calories: 8585-1344 kcals/d (20-25 kcal/kg adjusted BW/d)  Protein: 68-72g/d (1.0-1.1 g/kg adjusted BW/d)  Fluid:  2414 ml/d (20 mL/kg actual BW/d)    BARIATRIC DIET DISCUSSION:  Discussed diet after surgery and related to patients food record.  Reviewed diet progression before and after surgery.  Reinforced that surgery is not a magic bullet and importance of low fat foods and no snacking.    RECOMMENDATIONS:  Patient is a good candidate for bariatric surgery.      PLAN:  Resume work-up for surgery.  Continue to review Bariatric Nutrition Guidebook at home and call with any questions.  Work on Bariatric Nutrition Checklist.

## 2024-09-25 NOTE — PROGRESS NOTES
BEHAVIOR MODIFICATION AND EXERCISE CONSULT    PERSONAL:     What initiated your interest in bariatric surgery?  Gained weight after surgery, wants to have more energy, has intercranial HTN    Marital Status: []Single   [x]   []   []      Do you have children? 3 (3,4,9)    Do you have childcare issues?    What is your highest level of education completed? (Master's, Education Leadership)    Who is your social or relational support?  and mom    Do you work? 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 Love teaching    Were there any particular events that lead to significant weight gain? []Loss of a loved one  [x]Pregnancy  []Trauma, accident, illness  []Loss of employment []Other    PHYSICAL ACTIVITY:    Do you currently exercise: [x]Yes   []No    If so, please describe: ride bike 2x/week outdoors.    Have you experienced any injuries and/or restrictions that may limit your physical activity? []Yes   [x]No    If so, please describe:     BEHAVIORS:    What behavior(s) would you like to change in order to be healthy? Would like to be more active for longer periods of time, sleep better, eat healthier    On a scale of 1-10 (1-extremely low, 10-extremely high), how motivated are you to change your behavior(s)? 10    Do you currently use any type of tobacco products (vape, dip, cigarettes, etc.) No    If yes, on average, how many and/or how often do you use these products on a daily basis?    How many hours of sleep do you average? 7-8    Rate your stress level on a scale of 1-10 (1-extremely low, 10-extremely high) 2    What is your biggest life stressor? child    How do you cope and/or manage stress? Take time for myself, reach out for help, it is okay to quit and walk away    Is your appetite affected by stress, boredom, depression, etc.?  Currently taking Mounjaro, but typically yes.    Have  you ever seen a counselor or therapist? no      CURRENT WEIGHT: 266 HIGHEST WEIGHT: 299 LOWEST ADULT WEIGHT: 265 GOAL WEIGHT: 200    WAIST/HIP: 48/56    HANDOUTS: Bariatric booklet    NOTES: Body composition was conducted and patient was educated on results.      SCOOBY Mcgraw, CPT, CHC

## 2024-10-02 ENCOUNTER — TELEPHONE (OUTPATIENT)
Dept: SURGERY | Facility: CLINIC | Age: 29
End: 2024-10-02
Payer: COMMERCIAL

## 2024-10-02 NOTE — TELEPHONE ENCOUNTER
----- Message from Maeve sent at 9/30/2024  8:56 AM CDT -----  Regarding: Order UGI  Pt needs UGI ordered.   She would like it scheduled in [place].

## 2024-10-02 NOTE — TELEPHONE ENCOUNTER
UGI scheduled for 10/10/24 @ 8: 00 AM at [facility]  Informed pt, she voiced understanding

## 2024-10-28 ENCOUNTER — PATIENT MESSAGE (OUTPATIENT)
Dept: SURGERY | Facility: CLINIC | Age: 29
End: 2024-10-28
Payer: COMMERCIAL

## 2024-11-11 ENCOUNTER — CLINICAL SUPPORT (OUTPATIENT)
Dept: BARIATRICS | Facility: HOSPITAL | Age: 29
End: 2024-11-11
Payer: COMMERCIAL

## 2024-11-11 VITALS — BODY MASS INDEX: 45.4 KG/M2 | WEIGHT: 264.5 LBS

## 2024-11-11 DIAGNOSIS — E66.9 OBESITY: Primary | ICD-10-CM

## 2024-11-11 DIAGNOSIS — E66.01 MORBID OBESITY WITH BMI OF 45.0-49.9, ADULT: Primary | ICD-10-CM

## 2024-11-11 PROCEDURE — 94200023 HC BARIATRIC CONSULT - 60 MINS

## 2024-11-11 NOTE — PROGRESS NOTES
"Patient Education [x] MSWL Visit Number: 1/4     []  Pre-op Wt Loss Goal (as ordered on referral):   [] NSWL Visit:     Height:   Ht Readings from Last 1 Encounters:   09/25/24 5' 4" (1.626 m)      Weight:   Wt Readings from Last 3 Encounters:   09/25/24 1400 120.7 kg (266 lb)   09/25/24 1259 120.7 kg (266 lb)   09/25/24 1320 120.7 kg (266 lb)   09/25/24 1250 120.7 kg (266 lb)      BMI:   BMI Readings from Last 1 Encounters:   09/25/24 45.66 kg/m²                                                                        Barriers to learning:  [x]None evident  []Acuity of illness  []Cognitive defects  []Cultural barriers  []Desire/Motivation  []Difficulty concentrating  []Emotional state  []Financial concerns  []Hearing deficit  []Language barrier  []Literacy  []Memory problems  []Vision impairment     Home caregiver present for session   []YES  [x] NO     Teaching methods:  [x]Demonstration  [x]Explanation  [x]Printed materials  []Teach back  []Virtual/web based     Verbalizes understanding Demonstrates  Needs further teaching Needs practice/ supervision Comments    Bariatric Surgery Diet  [] [] [] []    Clear liquid [] [] [] []    Full liquid [] [] [] []    Weight Reduction [x] [] [] []    Other Diet [] [] [] []      Expected Compliance:  [x]Good  []Fair  []Poor    Additional Information:    Pt presents today for 1/4 MSW visit. 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 She has been working on reducing soft drinks and has gotten down to limiting to one daily. We discussing having a breakfast with protein daily and prepping dinner at home instead of eating out. Gave pt snack list to choose protein options if still hungry during day.    24 hr recall:  Breakfast: protein shake or yogurt and muffin bites  Lunch: sandwich or salad premade kit girl dinner with fruit   Dinner: big meal- fast food or " hamburger no french fries no coke or kids meal  Water - 50 oz and 1 diet coke    Plan:  Prep dinner ahead of time instead of eating out/fast food  Use snack list to choose protein snacks if still hungry- increase satiety  Limit soft drinks to once every other day- increase water intake     ESTIMATED NEEDS:  Calories: 1085-1314 kcals/d (20-25 kcal/kg adjusted BW/d)  Protein: 68-72g/d       (1.0-1.1 g/kg adjusted BW/d)  Fluid:  2414 ml/d        (20 mL/kg actual BW/d)

## 2024-11-11 NOTE — PROGRESS NOTES
A surgical medically supervised weight loss visit was conducted today.  Patient's weight is 264.5 lbs. She has increased her ADL's. A body composition was conducted.    PLAN:  - Patient will walk or bike 3x/week for 30 min. Or more.  - Patient will practice the steps to overcome emotional eating (handout given).  - Patient will follow dietary advice from MARGE Arreaga RD.    It is my professional opinion that patient is in the preparation stage of behavior change.    SCOOBY Mcgraw, CPT, CHC

## 2024-12-04 ENCOUNTER — PATIENT MESSAGE (OUTPATIENT)
Dept: SURGERY | Facility: CLINIC | Age: 29
End: 2024-12-04
Payer: COMMERCIAL

## 2024-12-23 ENCOUNTER — CLINICAL SUPPORT (OUTPATIENT)
Dept: BARIATRICS | Facility: HOSPITAL | Age: 29
End: 2024-12-23
Payer: COMMERCIAL

## 2024-12-23 VITALS — HEIGHT: 64 IN | BODY MASS INDEX: 44.68 KG/M2 | WEIGHT: 261.69 LBS

## 2024-12-23 DIAGNOSIS — E66.01 MORBID OBESITY WITH BMI OF 40.0-44.9, ADULT: Primary | ICD-10-CM

## 2024-12-23 DIAGNOSIS — E66.9 OBESITY: Primary | ICD-10-CM

## 2024-12-23 PROCEDURE — 94200023 HC BARIATRIC CONSULT - 60 MINS: Performed by: DIETITIAN, REGISTERED

## 2024-12-23 NOTE — PROGRESS NOTES
A surgical medically supervised weight loss visit was conducted today.  Patient's weight is 261 lbs. She has lost 3.5 LBS. Since her last visit. She is doing the elliptical. She reports that she is practicing mindful eating. No issues or concerns at this time.      PLAN:  - Patient will continue with current exercise regimen.  - Patient will follow dietary advice from Denisse Coronel RD.    It is my professional opinion that patient is in the preparation stage of behavior change.    SCOOBY Mcgraw, CPT, CHC

## 2024-12-23 NOTE — PROGRESS NOTES
"Patient Education [x] [facility] Visit Number: 2/4     []  Pre-op Wt Loss Goal (as ordered on referral):   [] [facility] Visit:     Height:   Ht Readings from Last 1 Encounters:   12/23/24 5' 4" (1.626 m)      Weight:   Wt Readings from Last 3 Encounters:   12/23/24 1125 118.7 kg (261 lb 11.2 oz)   11/11/24 1114 120 kg (264 lb 8 oz)   09/25/24 1400 120.7 kg (266 lb)      BMI:   BMI Readings from Last 1 Encounters:   12/23/24 44.92 kg/m²                                                                          Barriers to learning:  [x]None evident  []Acuity of illness  []Cognitive defects  []Cultural barriers  []Desire/Motivation  []Difficulty concentrating  []Emotional state  []Financial concerns  []Hearing deficit  []Language barrier  []Literacy  []Memory problems  []Vision impairment     Home caregiver present for session   []YES  [x] NO       Teaching methods:  []Demonstration  [x]Explanation  []Printed materials  []Teach back  []Virtual/web based         Verbalizes understanding Demonstrates  Needs further teaching Needs practice/ supervision Comments    Bariatric Surgery Diet  [] [] [] []    Clear liquid [] [] [] []    Full liquid [] [] [] []    Weight Reduction [x] [] [] []    Other Diet [] [] [] []          Additional Learner (s) Present                                                                  []Spouse   []Daughter    []  Son  []Family member   []Friend   []Grandfather   []Grandmother   []Father   []Mother   []Other       Expected Compliance:  [x]Good  []Fair  []Poor    Additional Information:    Pt with 5# wt loss since consultation. Pt continues to make changes and modifications to her daily choices: increased water intake, more protein rich snacks, less sugar based foods, more non-starchy vegetables. Pt continues to consume 1 soft drink per day, but it's diet. Pt is becoming more mindful by recognizing the desire to eat from stress. Pt is practicing strategies to change this response. Pt is progressing well. "     Plan:  Continue to focus on more protein centered meals and snacks.  Continue to practice mindful eating strategies.  Continue current water intake.  Pt is aware carbonated and caffeinated beverages will need to be discontinued once she has surgery.

## 2025-01-29 ENCOUNTER — CLINICAL SUPPORT (OUTPATIENT)
Dept: BARIATRICS | Facility: HOSPITAL | Age: 30
End: 2025-01-29
Payer: COMMERCIAL

## 2025-01-29 VITALS — BODY MASS INDEX: 44.56 KG/M2 | BODY MASS INDEX: 44.92 KG/M2 | WEIGHT: 261 LBS | WEIGHT: 261.69 LBS | HEIGHT: 64 IN

## 2025-01-29 DIAGNOSIS — E66.9 OBESITY: Primary | ICD-10-CM

## 2025-01-29 DIAGNOSIS — E66.01 MORBID OBESITY WITH BMI OF 40.0-44.9, ADULT: Primary | ICD-10-CM

## 2025-01-29 PROCEDURE — 94200034 HC BARIATRIC NUTRITIONAL SVCS PT GRADE I: Performed by: DIETITIAN, REGISTERED

## 2025-01-29 NOTE — PROGRESS NOTES
A surgical medically supervised weight loss visit was conducted today.  Patient's weight is 261 LBS. She has not lost any weight since her last visit. She is exercising on the elliptical 2-3x/week for 16 min. And is practicing mindful eating. She has recently discovered that she allergic to dairy.      PLAN:  - Patient will continue with current exercise regimen, but increase time.  - Patient will follow dietary advice from MARGE Arreaga RD.    It is my professional opinion that patient is in the preparation stage of behavior change.    SCOOBY Mcgraw, CPT, CHC

## 2025-01-29 NOTE — PROGRESS NOTES
"Patient Education [x] [facility] Visit Number: 3/4     []  Pre-op Wt Loss Goal (as ordered on referral):   [] [facility] Visit:     Height:   Ht Readings from Last 1 Encounters:   01/29/25 5' 4" (1.626 m)      Weight:   Wt Readings from Last 3 Encounters:   01/29/25 1443 118.4 kg (261 lb)   01/29/25 1415 118.7 kg (261 lb 11.2 oz)   12/23/24 1125 118.7 kg (261 lb 11.2 oz)      BMI:   BMI Readings from Last 1 Encounters:   01/29/25 44.80 kg/m²                                                                          Barriers to learning:  [x]None evident  []Acuity of illness  []Cognitive defects  []Cultural barriers  []Desire/Motivation  []Difficulty concentrating  []Emotional state  []Financial concerns  []Hearing deficit  []Language barrier  []Literacy  []Memory problems  []Vision impairment     Home caregiver present for session   []YES  [x] NO       Teaching methods:  []Demonstration  [x]Explanation  []Printed materials  []Teach back  []Virtual/web based         Verbalizes understanding Demonstrates  Needs further teaching Needs practice/ supervision Comments    Bariatric Surgery Diet  [] [] [] []    Clear liquid [] [] [] []    Full liquid [] [] [] []    Weight Reduction [x] [] [] []    Other Diet [] [] [] []          Additional Learner (s) Present                                                                  []Spouse   []Daughter    []  Son  []Family member   []Friend   []Grandfather   []Grandmother   []Father   []Mother   []Other       Expected Compliance:  [x]Good  []Fair  []Poor    Additional Information:    Pt's wt stable from last month's visit. Pt is currently on an elimination diet for dairy due to recurrent hives and allergic reactions. Pt reports less breakouts since eliminating dairy. Pt has continued to avoid Dr. Pepper and is drinking adequate water throughout the day. Pt is incorporating protein at each meal and has found dairy free options that she can tolerate. Pt is attempting to eat earlier in the evening " also (6:30pm).    Plan:  Continue current dairy free meal plan per physician recommendations.  Continue to focus on protein centered meals.  Continue adequate water intake.

## 2025-02-27 ENCOUNTER — CLINICAL SUPPORT (OUTPATIENT)
Dept: BARIATRICS | Facility: HOSPITAL | Age: 30
End: 2025-02-27
Payer: COMMERCIAL

## 2025-02-27 ENCOUNTER — TELEPHONE (OUTPATIENT)
Dept: SURGERY | Facility: CLINIC | Age: 30
End: 2025-02-27
Payer: COMMERCIAL

## 2025-02-27 ENCOUNTER — LAB VISIT (OUTPATIENT)
Dept: LAB | Facility: HOSPITAL | Age: 30
End: 2025-02-27
Attending: SURGERY
Payer: COMMERCIAL

## 2025-02-27 VITALS — WEIGHT: 259.69 LBS | BODY MASS INDEX: 44.58 KG/M2

## 2025-02-27 DIAGNOSIS — K27.9 PEPTIC ULCER ASSOCIATED WITH HELICOBACTER PYLORI INFECTION: ICD-10-CM

## 2025-02-27 DIAGNOSIS — E66.9 OBESITY: Primary | ICD-10-CM

## 2025-02-27 DIAGNOSIS — E66.01 MORBID OBESITY WITH BMI OF 40.0-44.9, ADULT: Primary | ICD-10-CM

## 2025-02-27 DIAGNOSIS — K27.9 PEPTIC ULCER ASSOCIATED WITH HELICOBACTER PYLORI INFECTION: Primary | ICD-10-CM

## 2025-02-27 DIAGNOSIS — B96.81 PEPTIC ULCER ASSOCIATED WITH HELICOBACTER PYLORI INFECTION: Primary | ICD-10-CM

## 2025-02-27 DIAGNOSIS — B96.81 PEPTIC ULCER ASSOCIATED WITH HELICOBACTER PYLORI INFECTION: ICD-10-CM

## 2025-02-27 PROCEDURE — 83013 H PYLORI (C-13) BREATH: CPT

## 2025-02-27 PROCEDURE — 94200023 HC BARIATRIC CONSULT - 60 MINS

## 2025-02-27 NOTE — PROGRESS NOTES
"Patient Education [x] [facility] Visit Number: 4/4     []  Pre-op Wt Loss Goal (as ordered on referral):   [] [facility] Visit:     Height:   Ht Readings from Last 1 Encounters:   01/29/25 5' 4" (1.626 m)      Weight:   Wt Readings from Last 3 Encounters:   02/27/25 1534 117.8 kg (259 lb 11.2 oz)   01/29/25 1443 118.4 kg (261 lb)   01/29/25 1415 118.7 kg (261 lb 11.2 oz)      BMI:   BMI Readings from Last 1 Encounters:   02/27/25 44.58 kg/m²                                                                        Barriers to learning:  [x]None evident  []Acuity of illness  []Cognitive defects  []Cultural barriers  []Desire/Motivation  []Difficulty concentrating  []Emotional state  []Financial concerns  []Hearing deficit  []Language barrier  []Literacy  []Memory problems  []Vision impairment     Home caregiver present for session   []YES  [x] NO     Teaching methods:  []Demonstration  [x]Explanation  [x]Printed materials  []Teach back  []Virtual/web based     Verbalizes understanding Demonstrates  Needs further teaching Needs practice/ supervision Comments    Bariatric Surgery Diet  [] [] [] []    Clear liquid [] [] [] []    Full liquid [] [] [] []    Weight Reduction [x] [] [] []    Other Diet [] [] [] []      Expected Compliance:  [x]Good  []Fair  []Poor    Additional Information:    Pt presents with a 2# weight loss since last month's session. She claims she recently was diagnosed with a gluten intolerance. We discussed foods to avoid with this along with starting to meal prep to avoid potentially eating gluten and to prepare for surgery. Gave her pre-op diet handout (no starches so gluten free diet) and gave her a few plant based protein shakes to try for after surgery. Pt understood. Still working on completely removing Dr. Pepper's from diet.    Plan:  Start meal prepping high protein, low gluten/starch meals for week- use pre op diet handout   Start trying out plant based protein shakes - try Evolve or Orgain ready made " blanca  Continue current dairy free meal plan per physician recommendations.  Continue adequate water intake and reducing Dr. Mojica.

## 2025-02-27 NOTE — PROGRESS NOTES
A surgical medically supervised weight loss visit was conducted today.  Patient's weight is 259.7 lbs. She has lost 1 lb. She is doing her elliptical for 15 min. And staying active helping her brother remodel. She admits that she is practicing mindful eating behaviors. No issues or concerns at this time.      PLAN:  - Patient will continue with current exercise regimen.  - Patient will follow dietary advice from MARGE Arreaga RD.    It is my professional opinion that patient is in the preparation stage of behavior change.    SCOOBY Mcgraw, CPT, CHC

## 2025-02-27 NOTE — TELEPHONE ENCOUNTER
----- Message from Maria Teresa Frazier sent at 2/27/2025  2:49 PM CST -----  Regarding: RE: Price for surgery  Left message for her advising I have an updated estimate for her.  ----- Message -----  From: Dejah Max MA  Sent: 2/27/2025   2:41 PM CST  To: Maria Teresa Frazier; Melanie Villaseñor  Subject: Price for surgery                                Pt requesting price she will owe for VSG. She is aware the price may change closer to surgery but she needs a estimate. Planning to have surgery June/July

## 2025-02-28 LAB — UREA BREATH TEST QL: NEGATIVE

## 2025-03-05 ENCOUNTER — TELEPHONE (OUTPATIENT)
Dept: SURGERY | Facility: CLINIC | Age: 30
End: 2025-03-05
Payer: COMMERCIAL

## 2025-03-10 ENCOUNTER — TELEPHONE (OUTPATIENT)
Dept: SURGERY | Facility: CLINIC | Age: 30
End: 2025-03-10
Payer: COMMERCIAL

## 2025-03-10 NOTE — TELEPHONE ENCOUNTER
I tried calling pt to see if UGI was actually done or not  Last I spoke with the pt in December she was very busy and would call and schedule UGI once she had time to compelte it  No answer, LM- I will try e-mailing her since we have trouble getting in touch with her

## 2025-03-10 NOTE — TELEPHONE ENCOUNTER
----- Message from Med Assistant Pond sent at 2/27/2025  2:42 PM CST -----  Pt stated she had EGD with Dr. Viveros 10/2024Also will need UGI resultsPlease request both

## 2025-04-30 ENCOUNTER — TELEPHONE (OUTPATIENT)
Dept: SURGERY | Facility: CLINIC | Age: 30
End: 2025-04-30
Payer: COMMERCIAL

## 2025-04-30 ENCOUNTER — PATIENT MESSAGE (OUTPATIENT)
Dept: SURGERY | Facility: CLINIC | Age: 30
End: 2025-04-30
Payer: COMMERCIAL

## 2025-04-30 DIAGNOSIS — E66.01 MORBID OBESITY: Primary | ICD-10-CM

## 2025-04-30 NOTE — TELEPHONE ENCOUNTER
Procedure:  [x] VSG    [] RNY GBP   [] Revision   Insurance: [employer]  MD: [] Dr. Landers     [x] Dr. Whitehead     [] Dr. Moya   Assist:  [] N/A    [] Dr. Landers     [] Dr. Whitehead     [] Dr. Moya   [x] NP - [facility]    Surgery Date:  6/17/25  Education Class: 5/20/25  Pre Op Appt: 5/28/25 at 9:45 a.m.   Facility:  [] [facility]  [x] [facility]  [] [facility]  Inpatient/ Outpatient:  [x] Inpatient  [] Outpatient      Confirmed the following with patient:  Smoking Hx: [] Yes   [x] No  Personal h/o blood clots/bleeding disorders:  [] Yes  [x] No  Family h/o blood clots/bleeding disorders: [] Yes  [x] No  Taking oral contraceptives/hormone meds/testosterone:  [] Yes  [] No  Scheduled elective procedures/ travel in the next 30 days:  [] Yes  [x] No  GLP/Blood Thinners:  [x] Yes  [] No  Specialist: [] Yes  [x] No    Please create case request.

## 2025-05-19 ENCOUNTER — CLINICAL SUPPORT (OUTPATIENT)
Dept: BARIATRICS | Facility: HOSPITAL | Age: 30
End: 2025-05-19

## 2025-05-19 DIAGNOSIS — Z71.3 ENCOUNTER FOR NUTRITIONAL COUNSELING: Primary | ICD-10-CM

## 2025-05-19 NOTE — PROGRESS NOTES
Pt was educated on the pre-op diet below. Pt was told to purchase a food scale and that it would be required to use for diet. Pt understood.    BARIATRIC PRE-OP DIET   3 Week Diet Protocol    Pt's sx date is 6/17/25 so she will start the pre-op diet on 5/24/25 and follow it for 3 weeks.     Pre Op Diet Consists of 3 meals and 2 snacks:     AM MEAL  4 oz of skim or 1% milk (1/2 cup)  2 oz of fruit (1/4 cup)  4 oz protein    MID MORNING MEAL  1 oz nuts  4 oz of fruit or 1/2 oz dried fruit    NOON MEAL  4 oz of protein  2 cups of salad or non-starchy vegetables  1 tbsp of healthful fat    AFTERNOON MEAL  6 oz light yogurt- plain or vanilla  2 oz of fruit (1/4 cup)    PM MEAL  4 oz of protein  2 cups of salad or non-starchy vegetables  1 tbsp of healthful fat    *eliminate fruits 3-5 days prior to surgery*

## 2025-05-27 ENCOUNTER — CLINICAL SUPPORT (OUTPATIENT)
Dept: BARIATRICS | Facility: HOSPITAL | Age: 30
End: 2025-05-27

## 2025-05-27 DIAGNOSIS — Z71.3 ENCOUNTER FOR NUTRITIONAL COUNSELING: Primary | ICD-10-CM

## 2025-05-27 RX ORDER — PANTOPRAZOLE SODIUM 40 MG/1
40 TABLET, DELAYED RELEASE ORAL 2 TIMES DAILY
COMMUNITY

## 2025-05-27 RX ORDER — BUSPIRONE HYDROCHLORIDE 5 MG/1
5 TABLET ORAL 2 TIMES DAILY
COMMUNITY

## 2025-05-27 RX ORDER — DUPILUMAB 300 MG/2ML
300 INJECTION, SOLUTION SUBCUTANEOUS
COMMUNITY

## 2025-05-27 NOTE — PROGRESS NOTES
Date of education: 5/27/25  Pt education type: [x]Pre op  []Post op  Surgery date: 6/17/25  Type of surgery: sleeve gastrectomy     Education was provided on:   [x]Importance of protein and vitamin protocol  [x]Importance of drinking  fl oz/day of non carbonated sugar free non caffeinated beverages  [x]Importance of following dietary protocol  []Importance of early ambulation postop   []Use of incentive spirometer 10 times an hour while awake  []Non opiod pain management post op   []Discontinuing use of meds containing aspirin, ibuprofen, NSAIDs, post op  []Signs and symptoms of immediate and long term complications post-op  []Prevention and signs and symptoms of blood clots   []Prevention and signs of infection  []Reviewed medication regimen  [x]Importance of adhering to behavioral changes  []Importance of following exercise protocol      Barriers to learning:  [x]None evident  []Acuity of illness  []Cognitive defects  []Cultural barriers  []Desire/Motivation  []Difficulty concentrating  []Emotional state  []Financial concerns  []Hearing deficit  []Language barrier  []Literacy  []Memory problems  []Vision impairment     Teaching methods:  [x]Demonstration  [x]Explanation  [x]Printed materials  [x]Teach back  [x]Virtual/web based    Expected Compliance:  [x]Good  []Fair  []Poor    Additional Notes: Pt was very attentive during class, took notes, and asked educated/relevant questions.

## 2025-05-27 NOTE — PROGRESS NOTES
HISTORY & PHYSICAL  Bariatric Preoperative Note  General Surgery    Patient Name: Perla Vu  YOB: 1995    Date: 2025                   SUBJECTIVE:     Chief Complaint/Reason for Admission:   Chief Complaint   Patient presents with    Pre-op Exam      VSG 2025 @ [facility] ([place])          History of Present Illness:  29 y.o. yo here for pre-op for Laparoscopic Vertical Sleeve Gastrectomy.  Pt denies any changes to She history since She last examination.  All questions were answered in regards to surgery.  I once again reviewed all the risks / benefits of surgery with the patient in regards to the Vertical Sleeve Gastrectomy, She voiced She understanding and wishes to continue.     Review of Systems:  12 point ROS negative except as stated in HPI    PAST HISTORY:     Past Medical History:   Diagnosis Date    Allergy     Seasonal allergies and skin allergies    Anxiety     Controlled    Asthma     Eosinophilic esophagitis     GERD (gastroesophageal reflux disease)     Migraines     Obesity     Papilledema     Pseudotumor cerebri      Past Surgical History:   Procedure Laterality Date    ADENOIDECTOMY  1996     SECTION  2015     SECTION  2019     SECTION WITH TUBAL LIGATION      ESOPHAGEAL DILATION      LUMBAR PUNCTURE      TUBAL LIGATION      WISDOM TOOTH EXTRACTION  2010     Family History   Problem Relation Name Age of Onset    Aneurysm Paternal Grandfather Grandfather     Obesity Paternal Grandfather Grandfather     Hypertension Mother Mona     Obesity Father Gene     Stroke Maternal Grandfather Kaiden     Cancer Maternal Grandmother Isabel     Obesity Maternal Grandmother Isabel      Social History     Socioeconomic History    Marital status:    Tobacco Use    Smoking status: Never    Smokeless tobacco: Never   Vaping Use    Vaping status: Never Used   Substance and Sexual Activity    Alcohol use: Not Currently    Drug use: Never     Sexual activity: Yes     Partners: Male     Birth control/protection: Other-see comments, None     Comment: Tubes tied     Social Drivers of Health     Financial Resource Strain: Low Risk  (9/25/2024)    Overall Financial Resource Strain (CARDIA)     Difficulty of Paying Living Expenses: Not hard at all   Food Insecurity: No Food Insecurity (9/25/2024)    Hunger Vital Sign     Worried About Running Out of Food in the Last Year: Never true     Ran Out of Food in the Last Year: Never true   Physical Activity: Insufficiently Active (9/25/2024)    Exercise Vital Sign     Days of Exercise per Week: 3 days     Minutes of Exercise per Session: 20 min   Stress: Stress Concern Present (9/25/2024)    [employer] - Occupational Stress Questionnaire     Feeling of Stress : To some extent   Housing Stability: Unknown (9/25/2024)    Housing Stability Vital Sign     Unable to Pay for Housing in the Last Year: No       MEDICATIONS & ALLERGIES:     Current Facility-Administered Medications on File Prior to Visit   Medication    ceFAZolin 2 g    enoxaparin injection 40 mg    lactated ringers infusion     Current Outpatient Medications on File Prior to Visit   Medication Sig    albuterol-budesonide (AIRSUPRA) 90-80 mcg/actuation Inhale 2 puffs into the lungs as needed.    busPIRone (BUSPAR) 5 MG Tab Take 5 mg by mouth as needed.    dupilumab (DUPIXENT PEN) 300 mg/2 mL PnIj Inject 300 mg into the skin every 14 (fourteen) days. Once a week injection    escitalopram oxalate (LEXAPRO ORAL)  (Patient not taking: Reported on 5/27/2025)    esomeprazole (NEXIUM) 20 MG capsule Take 20 mg by mouth as needed.    pantoprazole (PROTONIX) 40 MG tablet Take 40 mg by mouth nightly.    tirzepatide (MOUNJARO) 2.5 mg/0.5 mL PnIj Inject 2.5 mg into the skin every 7 days. (Patient not taking: Reported on 5/27/2025)     Review of patient's allergies indicates:   Allergen Reactions    Gluten protein Hives    Codeine Nausea  "And Vomiting       OBJECTIVE:   Visit Vitals  BP (!) 146/87   Pulse 73   Ht 5' 4" (1.626 m)   Wt 122.4 kg (269 lb 12.8 oz)   BMI 46.31 kg/m²       Physical Exam:  General:  Well developed, well nourished, no acute distress  HEENT:  Normocephalic, atraumatic, PERRL, EOMI, clear sclera, ears normal, neck supple, throat clear without erythema or exudates  CVS:  RRR, S1 and S2 normal, no murmurs, rubs, gallops  Resp:  Lungs clear to auscultation, no wheezes, rales, rhonchi, cough  GI:  Abdomen soft, non-tender, non-distended, normoactive bowel sounds, no masses  :  Deferred  MSK:  No muscle atrophy, cyanosis, peripheral edema, full range of motion  Skin:  No rashes, ulcers, erythema  Neuro:  CNII-XII grossly intact  Psych:  Alert and oriented to person, place, and time    Results:  I have independently reviewed all pertinent lab and radiologic studies relevant to general/bariatric surgery.      VISIT DIAGNOSES:       ICD-10-CM ICD-9-CM   1. Preop examination  Z01.818 V72.84   2. Morbid obesity  E66.01 278.01       ASSESSMENT/PLAN:     The patient is a morbidly obese female with a Body mass index is 46.31 kg/m². with significant weight related co-morbidity including: chronic low back pain, gastroesophageal reflux disease, migraines and anxiety.  She has been unable to lose her weight and improve her co-morbid conditions with medical management including diet and exercise.      RECOMMENDATION:     Perla Vu has voluntarily decided to undergo laparoscopic vertical sleeve gastrectomy under the care of Miky Whitehead M.D. The patient has stated that the performance of this procedure requres major intra-abdominal surgery and therefore carries certain risks. Like all major surgery, there is a chance of death during or immediately following the procedure. The risk varies depending on the age, weight and the medical background of each individual patient.    Additionally, there can be complications as a result of the " procedure. General anesthesia is a required and a respirator is necessary during the operation. The risk is greater in patients with a history of smoking, that weigh more than 400 pounds, have a BMI >55 or can not walk up a flight of stairs.    Performance of the surgery requires a long division of the stomach with a special stapler. If leakage occurs, additional surgery or drainage procedures may need to be performed. In addition, since the stomach lies in close proximity to the spleen, the possibility of splenic injury requiring splenectomy may occur.    In addition, complications may occur in wound healing. These may include wound infections, fascial disruptions and hernias which may require future surgical repair. Wound drainage is common in obese patients. In addition to these outlined complications, there can be additional problems which can occur in all major operations. These include infection, unexpected myocardial infarctions and the formation of blood clots leading to pulmonary embolism.    The patient voiced there understanding of the above and that the procedure may be converted to an open procedure if the surgeon feels the surgery can not safely continue laparoscopically.    The patient also voiced there understanding that patients who undergo this surgery generally lose a significant amount of weight and keep it off, but no operation can guarantee permanent weight reduction.

## 2025-05-27 NOTE — H&P (VIEW-ONLY)
HISTORY & PHYSICAL  Bariatric Preoperative Note  General Surgery    Patient Name: Perla Vu  YOB: 1995    Date: 2025                   SUBJECTIVE:     Chief Complaint/Reason for Admission:   Chief Complaint   Patient presents with    Pre-op Exam      VSG 2025 @ [facility] ([place])          History of Present Illness:  29 y.o. yo here for pre-op for Laparoscopic Vertical Sleeve Gastrectomy.  Pt denies any changes to She history since She last examination.  All questions were answered in regards to surgery.  I once again reviewed all the risks / benefits of surgery with the patient in regards to the Vertical Sleeve Gastrectomy, She voiced She understanding and wishes to continue.     Review of Systems:  12 point ROS negative except as stated in HPI    PAST HISTORY:     Past Medical History:   Diagnosis Date    Allergy     Seasonal allergies and skin allergies    Anxiety     Controlled    Asthma     Eosinophilic esophagitis     GERD (gastroesophageal reflux disease)     Migraines     Obesity     Papilledema     Pseudotumor cerebri      Past Surgical History:   Procedure Laterality Date    ADENOIDECTOMY  1996     SECTION  2015     SECTION  2019     SECTION WITH TUBAL LIGATION      ESOPHAGEAL DILATION      LUMBAR PUNCTURE      TUBAL LIGATION      WISDOM TOOTH EXTRACTION  2010     Family History   Problem Relation Name Age of Onset    Aneurysm Paternal Grandfather Grandfather     Obesity Paternal Grandfather Grandfather     Hypertension Mother Mona     Obesity Father Gene     Stroke Maternal Grandfather Kaiden     Cancer Maternal Grandmother Isabel     Obesity Maternal Grandmother Isabel      Social History     Socioeconomic History    Marital status:    Tobacco Use    Smoking status: Never    Smokeless tobacco: Never   Vaping Use    Vaping status: Never Used   Substance and Sexual Activity    Alcohol use: Not Currently    Drug use: Never     Sexual activity: Yes     Partners: Male     Birth control/protection: Other-see comments, None     Comment: Tubes tied     Social Drivers of Health     Financial Resource Strain: Low Risk  (9/25/2024)    Overall Financial Resource Strain (CARDIA)     Difficulty of Paying Living Expenses: Not hard at all   Food Insecurity: No Food Insecurity (9/25/2024)    Hunger Vital Sign     Worried About Running Out of Food in the Last Year: Never true     Ran Out of Food in the Last Year: Never true   Physical Activity: Insufficiently Active (9/25/2024)    Exercise Vital Sign     Days of Exercise per Week: 3 days     Minutes of Exercise per Session: 20 min   Stress: Stress Concern Present (9/25/2024)    [employer] - Occupational Stress Questionnaire     Feeling of Stress : To some extent   Housing Stability: Unknown (9/25/2024)    Housing Stability Vital Sign     Unable to Pay for Housing in the Last Year: No       MEDICATIONS & ALLERGIES:     Current Facility-Administered Medications on File Prior to Visit   Medication    ceFAZolin 2 g    enoxaparin injection 40 mg    lactated ringers infusion     Current Outpatient Medications on File Prior to Visit   Medication Sig    albuterol-budesonide (AIRSUPRA) 90-80 mcg/actuation Inhale 2 puffs into the lungs as needed.    busPIRone (BUSPAR) 5 MG Tab Take 5 mg by mouth as needed.    dupilumab (DUPIXENT PEN) 300 mg/2 mL PnIj Inject 300 mg into the skin every 14 (fourteen) days. Once a week injection    escitalopram oxalate (LEXAPRO ORAL)  (Patient not taking: Reported on 5/27/2025)    esomeprazole (NEXIUM) 20 MG capsule Take 20 mg by mouth as needed.    pantoprazole (PROTONIX) 40 MG tablet Take 40 mg by mouth nightly.    tirzepatide (MOUNJARO) 2.5 mg/0.5 mL PnIj Inject 2.5 mg into the skin every 7 days. (Patient not taking: Reported on 5/27/2025)     Review of patient's allergies indicates:   Allergen Reactions    Gluten protein Hives    Codeine Nausea  "And Vomiting       OBJECTIVE:   Visit Vitals  BP (!) 146/87   Pulse 73   Ht 5' 4" (1.626 m)   Wt 122.4 kg (269 lb 12.8 oz)   BMI 46.31 kg/m²       Physical Exam:  General:  Well developed, well nourished, no acute distress  HEENT:  Normocephalic, atraumatic, PERRL, EOMI, clear sclera, ears normal, neck supple, throat clear without erythema or exudates  CVS:  RRR, S1 and S2 normal, no murmurs, rubs, gallops  Resp:  Lungs clear to auscultation, no wheezes, rales, rhonchi, cough  GI:  Abdomen soft, non-tender, non-distended, normoactive bowel sounds, no masses  :  Deferred  MSK:  No muscle atrophy, cyanosis, peripheral edema, full range of motion  Skin:  No rashes, ulcers, erythema  Neuro:  CNII-XII grossly intact  Psych:  Alert and oriented to person, place, and time    Results:  I have independently reviewed all pertinent lab and radiologic studies relevant to general/bariatric surgery.      VISIT DIAGNOSES:       ICD-10-CM ICD-9-CM   1. Preop examination  Z01.818 V72.84   2. Morbid obesity  E66.01 278.01       ASSESSMENT/PLAN:     The patient is a morbidly obese female with a Body mass index is 46.31 kg/m². with significant weight related co-morbidity including: chronic low back pain, gastroesophageal reflux disease, migraines and anxiety.  She has been unable to lose her weight and improve her co-morbid conditions with medical management including diet and exercise.      RECOMMENDATION:     Perla Vu has voluntarily decided to undergo laparoscopic vertical sleeve gastrectomy under the care of Miky Whitehead M.D. The patient has stated that the performance of this procedure requres major intra-abdominal surgery and therefore carries certain risks. Like all major surgery, there is a chance of death during or immediately following the procedure. The risk varies depending on the age, weight and the medical background of each individual patient.    Additionally, there can be complications as a result of the " procedure. General anesthesia is a required and a respirator is necessary during the operation. The risk is greater in patients with a history of smoking, that weigh more than 400 pounds, have a BMI >55 or can not walk up a flight of stairs.    Performance of the surgery requires a long division of the stomach with a special stapler. If leakage occurs, additional surgery or drainage procedures may need to be performed. In addition, since the stomach lies in close proximity to the spleen, the possibility of splenic injury requiring splenectomy may occur.    In addition, complications may occur in wound healing. These may include wound infections, fascial disruptions and hernias which may require future surgical repair. Wound drainage is common in obese patients. In addition to these outlined complications, there can be additional problems which can occur in all major operations. These include infection, unexpected myocardial infarctions and the formation of blood clots leading to pulmonary embolism.    The patient voiced there understanding of the above and that the procedure may be converted to an open procedure if the surgeon feels the surgery can not safely continue laparoscopically.    The patient also voiced there understanding that patients who undergo this surgery generally lose a significant amount of weight and keep it off, but no operation can guarantee permanent weight reduction.

## 2025-05-28 ENCOUNTER — OFFICE VISIT (OUTPATIENT)
Dept: SURGERY | Facility: CLINIC | Age: 30
End: 2025-05-28
Payer: COMMERCIAL

## 2025-05-28 ENCOUNTER — LAB VISIT (OUTPATIENT)
Dept: LAB | Facility: HOSPITAL | Age: 30
End: 2025-05-28
Attending: SURGERY
Payer: COMMERCIAL

## 2025-05-28 VITALS
DIASTOLIC BLOOD PRESSURE: 87 MMHG | HEART RATE: 73 BPM | BODY MASS INDEX: 46.06 KG/M2 | HEIGHT: 64 IN | WEIGHT: 269.81 LBS | SYSTOLIC BLOOD PRESSURE: 146 MMHG

## 2025-05-28 DIAGNOSIS — E66.01 MORBID OBESITY: ICD-10-CM

## 2025-05-28 DIAGNOSIS — Z01.818 PREOP EXAMINATION: ICD-10-CM

## 2025-05-28 DIAGNOSIS — Z01.818 PREOP EXAMINATION: Primary | ICD-10-CM

## 2025-05-28 LAB
ALBUMIN SERPL-MCNC: 3.8 G/DL (ref 3.5–5)
ALBUMIN/GLOB SERPL: 1.2 RATIO (ref 1.1–2)
ALP SERPL-CCNC: 75 UNIT/L (ref 40–150)
ALT SERPL-CCNC: 17 UNIT/L (ref 0–55)
ANION GAP SERPL CALC-SCNC: 9 MEQ/L
APTT PPP: 26.3 SECONDS (ref 23.2–33.7)
AST SERPL-CCNC: 17 UNIT/L (ref 11–45)
BASOPHILS # BLD AUTO: 0.02 X10(3)/MCL
BASOPHILS NFR BLD AUTO: 0.3 %
BILIRUB SERPL-MCNC: 0.5 MG/DL
BUN SERPL-MCNC: 18.7 MG/DL (ref 7–18.7)
CALCIUM SERPL-MCNC: 8.9 MG/DL (ref 8.4–10.2)
CHLORIDE SERPL-SCNC: 106 MMOL/L (ref 98–107)
CO2 SERPL-SCNC: 25 MMOL/L (ref 22–29)
CREAT SERPL-MCNC: 0.89 MG/DL (ref 0.55–1.02)
CREAT/UREA NIT SERPL: 21
EOSINOPHIL # BLD AUTO: 0.14 X10(3)/MCL (ref 0–0.9)
EOSINOPHIL NFR BLD AUTO: 1.9 %
ERYTHROCYTE [DISTWIDTH] IN BLOOD BY AUTOMATED COUNT: 14.3 % (ref 11.5–17)
GFR SERPLBLD CREATININE-BSD FMLA CKD-EPI: >60 ML/MIN/1.73/M2
GLOBULIN SER-MCNC: 3.2 GM/DL (ref 2.4–3.5)
GLUCOSE SERPL-MCNC: 74 MG/DL (ref 74–100)
HCT VFR BLD AUTO: 42.9 % (ref 37–47)
HGB BLD-MCNC: 13.3 G/DL (ref 12–16)
IMM GRANULOCYTES # BLD AUTO: 0.01 X10(3)/MCL (ref 0–0.04)
IMM GRANULOCYTES NFR BLD AUTO: 0.1 %
LYMPHOCYTES # BLD AUTO: 1.57 X10(3)/MCL (ref 0.6–4.6)
LYMPHOCYTES NFR BLD AUTO: 21.8 %
MCH RBC QN AUTO: 29 PG (ref 27–31)
MCHC RBC AUTO-ENTMCNC: 31 G/DL (ref 33–36)
MCV RBC AUTO: 93.5 FL (ref 80–94)
MONOCYTES # BLD AUTO: 0.51 X10(3)/MCL (ref 0.1–1.3)
MONOCYTES NFR BLD AUTO: 7.1 %
NEUTROPHILS # BLD AUTO: 4.94 X10(3)/MCL (ref 2.1–9.2)
NEUTROPHILS NFR BLD AUTO: 68.8 %
NRBC BLD AUTO-RTO: 0 %
PLATELET # BLD AUTO: 206 X10(3)/MCL (ref 130–400)
PMV BLD AUTO: 11.8 FL (ref 7.4–10.4)
POTASSIUM SERPL-SCNC: 4.6 MMOL/L (ref 3.5–5.1)
PROT SERPL-MCNC: 7 GM/DL (ref 6.4–8.3)
RBC # BLD AUTO: 4.59 X10(6)/MCL (ref 4.2–5.4)
SODIUM SERPL-SCNC: 140 MMOL/L (ref 136–145)
WBC # BLD AUTO: 7.19 X10(3)/MCL (ref 4.5–11.5)

## 2025-05-28 PROCEDURE — 99214 OFFICE O/P EST MOD 30 MIN: CPT | Mod: ,,, | Performed by: SURGERY

## 2025-05-28 PROCEDURE — 3079F DIAST BP 80-89 MM HG: CPT | Mod: CPTII,,, | Performed by: SURGERY

## 2025-05-28 PROCEDURE — 36415 COLL VENOUS BLD VENIPUNCTURE: CPT

## 2025-05-28 PROCEDURE — 85730 THROMBOPLASTIN TIME PARTIAL: CPT

## 2025-05-28 PROCEDURE — 3077F SYST BP >= 140 MM HG: CPT | Mod: CPTII,,, | Performed by: SURGERY

## 2025-05-28 PROCEDURE — 80053 COMPREHEN METABOLIC PANEL: CPT

## 2025-05-28 PROCEDURE — 85025 COMPLETE CBC W/AUTO DIFF WBC: CPT

## 2025-05-28 PROCEDURE — 3008F BODY MASS INDEX DOCD: CPT | Mod: CPTII,,, | Performed by: SURGERY

## 2025-05-28 PROCEDURE — 3044F HG A1C LEVEL LT 7.0%: CPT | Mod: CPTII,,, | Performed by: SURGERY

## 2025-06-06 ENCOUNTER — CLINICAL SUPPORT (OUTPATIENT)
Dept: BARIATRICS | Facility: HOSPITAL | Age: 30
End: 2025-06-06

## 2025-06-06 ENCOUNTER — ANESTHESIA EVENT (OUTPATIENT)
Dept: SURGERY | Facility: HOSPITAL | Age: 30
End: 2025-06-06
Payer: COMMERCIAL

## 2025-06-06 VITALS — HEIGHT: 64 IN | WEIGHT: 267.88 LBS | WEIGHT: 267.88 LBS | BODY MASS INDEX: 45.73 KG/M2 | BODY MASS INDEX: 45.98 KG/M2

## 2025-06-06 DIAGNOSIS — E66.01 MORBID OBESITY WITH BMI OF 45.0-49.9, ADULT: Primary | ICD-10-CM

## 2025-06-06 DIAGNOSIS — E66.9 OBESITY: Primary | ICD-10-CM

## 2025-06-11 ENCOUNTER — TELEPHONE (OUTPATIENT)
Dept: BARIATRICS | Facility: HOSPITAL | Age: 30
End: 2025-06-11

## 2025-06-11 ENCOUNTER — CLINICAL SUPPORT (OUTPATIENT)
Dept: BARIATRICS | Facility: HOSPITAL | Age: 30
End: 2025-06-11

## 2025-06-11 VITALS — BODY MASS INDEX: 43.56 KG/M2 | WEIGHT: 269.88 LBS

## 2025-06-11 DIAGNOSIS — E66.01 MORBID OBESITY WITH BMI OF 40.0-44.9, ADULT: Primary | ICD-10-CM

## 2025-06-11 NOTE — PROGRESS NOTES
Patient present for weigh in one week before bariatric surgery.    Weighed-in at 269.9# (2# gain).

## 2025-06-11 NOTE — TELEPHONE ENCOUNTER
Spoke with pt while in clinic. Pt denies eating off of the pre-op meal plan. Pt has stopped Dupixent as advised. Pt is still less 4# from pre-op class. Pt was advised to eliminate fruit now in order to reduce CHO intake further. Pt expressed understanding.    ----- Message from Maria Teresa Frazier sent at 2025  4:01 PM CDT -----  Regardinwk weigh-in  Weighed-in at 269.9 (2# gain) in lobby waiting for DTCould one of you add that to their chart please? Thank you

## 2025-06-12 ENCOUNTER — PATIENT MESSAGE (OUTPATIENT)
Dept: SURGERY | Facility: HOSPITAL | Age: 30
End: 2025-06-12
Payer: COMMERCIAL

## 2025-06-13 ENCOUNTER — PATIENT MESSAGE (OUTPATIENT)
Dept: SURGERY | Facility: HOSPITAL | Age: 30
End: 2025-06-13
Payer: COMMERCIAL

## 2025-06-16 ENCOUNTER — PATIENT MESSAGE (OUTPATIENT)
Dept: SURGERY | Facility: HOSPITAL | Age: 30
End: 2025-06-16
Payer: COMMERCIAL

## 2025-06-16 NOTE — PRE-PROCEDURE INSTRUCTIONS
"[facility]: Outpatient Surgery  Preprocedure Check-In Instructions     Your arrival time for your surgery or procedure is 7AM.      Expectations: "Because of inconsistent procedure completion times, an unexpected wait may occur. The Physicians would like you to be here to prepare in the event they run ahead of time. We will make you as comfortable as possible and keep you informed. We apologize in advance if this happens."    You will arrive at [facility], [address], [place].  Enter through the [place] entrance next to the Emergency Room, and come to the 6th floor to the Outpatient Surgery Department.     Visitory Policy:  You are allowed 2 adult visitors to be with you in the hospital. All hospital visitors should be in good current health.  No small children. No switching out of visitors while you are waiting to go to surgery, and while you are in surgery.      What to Bring:  Please have your ID, insurance cards, and all home medication bottles with you at check in.  Bring your CPAP machine if one is used at home.     Fasting:  Nothing to eat after midnight the night before your procedure. This includes no gum and/or tobacco products. You may have clear liquids limited to only: WATER, GATORADE, POWERADE and children can also have PEDIALYTE AND/OR APPLE JUICE , up until 2 hours before your arrival time.   Follow your doctor's instructions for taking any medications on the morning of your procedure.  If no instructions for taking medications were given, do not take any medications but bring your medications in their bottles to your procedure check in.     Follow your doctor's preoperative instructions regarding skin prep, bowel prep, bathing, or showering prior to your procedure.  If any special soaps were provided to you, please use according to your doctor's instructions. If no instructions were given from your doctor, take a good bath or shower with " antibacterial soap the night before and the morning of your procedure.  On the morning of procedure, wear loose, comfortable clothing.  No lotions, makeup, perfumes, colognes, deodorant, or jewelry to your procedure.  Removable items (glasses, contact lenses, dentures, retainers, hearing aids) need to be removed for your procedure.  Bring your storage containers for these items if you wear them.     Artificial nails, body jewelry, eyelash extensions, and/or hair extensions with metal clips are not allowed during your surgery.  If you currently wear any of these items, please arrange for them to be removed prior to your arrival to the hospital.     Outpatient or Same Day Surgeries:  Any patients receiving sedation/anesthesia are advised not to drive for 24 hours after their procedure.  We do not allow patients to drive themselves home after discharge.  If you are going home after your procedure, please have someone available to drive you home from the hospital.        You may call the Outpatient Surgery Department at [phone] with any questions or concerns.  We are looking forward to meeting you and taking great care of you for your procedure.  Thank you for choosing [facility] for your surgical needs

## 2025-06-17 ENCOUNTER — HOSPITAL ENCOUNTER (INPATIENT)
Facility: HOSPITAL | Age: 30
LOS: 1 days | Discharge: HOME | DRG: 621 | End: 2025-06-18
Attending: SURGERY | Admitting: SURGERY
Payer: COMMERCIAL

## 2025-06-17 ENCOUNTER — ANESTHESIA (OUTPATIENT)
Dept: SURGERY | Facility: HOSPITAL | Age: 30
End: 2025-06-17
Payer: COMMERCIAL

## 2025-06-17 DIAGNOSIS — E66.01 MORBID OBESITY: Primary | ICD-10-CM

## 2025-06-17 LAB
ABORH RETYPE: NORMAL
B-HCG UR QL: NEGATIVE
CTP QC/QA: YES
GROUP & RH: NORMAL
INDIRECT COOMBS: NORMAL
POCT GLUCOSE: 107 MG/DL (ref 70–110)
POCT GLUCOSE: 114 MG/DL (ref 70–110)
SPECIMEN OUTDATE: NORMAL

## 2025-06-17 PROCEDURE — 71000039 HC RECOVERY, EACH ADD'L HOUR: Performed by: SURGERY

## 2025-06-17 PROCEDURE — 36000710: Performed by: SURGERY

## 2025-06-17 PROCEDURE — 43775 LAP SLEEVE GASTRECTOMY: CPT | Mod: ,,, | Performed by: SURGERY

## 2025-06-17 PROCEDURE — 43775 LAP SLEEVE GASTRECTOMY: CPT | Mod: AS,,,

## 2025-06-17 PROCEDURE — 71000016 HC POSTOP RECOV ADDL HR: Performed by: SURGERY

## 2025-06-17 PROCEDURE — 71000033 HC RECOVERY, INTIAL HOUR: Performed by: SURGERY

## 2025-06-17 PROCEDURE — 63600175 PHARM REV CODE 636 W HCPCS: Performed by: SURGERY

## 2025-06-17 PROCEDURE — 3E0T3BZ INTRODUCTION OF ANESTHETIC AGENT INTO PERIPHERAL NERVES AND PLEXI, PERCUTANEOUS APPROACH: ICD-10-PCS | Performed by: SURGERY

## 2025-06-17 PROCEDURE — 0DB64Z3 EXCISION OF STOMACH, PERCUTANEOUS ENDOSCOPIC APPROACH, VERTICAL: ICD-10-PCS | Performed by: SURGERY

## 2025-06-17 PROCEDURE — 71000015 HC POSTOP RECOV 1ST HR: Performed by: SURGERY

## 2025-06-17 PROCEDURE — 25000003 PHARM REV CODE 250: Performed by: NURSE ANESTHETIST, CERTIFIED REGISTERED

## 2025-06-17 PROCEDURE — 37000009 HC ANESTHESIA EA ADD 15 MINS: Performed by: SURGERY

## 2025-06-17 PROCEDURE — 36000711: Performed by: SURGERY

## 2025-06-17 PROCEDURE — 63600175 PHARM REV CODE 636 W HCPCS: Mod: JZ,TB | Performed by: NURSE ANESTHETIST, CERTIFIED REGISTERED

## 2025-06-17 PROCEDURE — 63600175 PHARM REV CODE 636 W HCPCS

## 2025-06-17 PROCEDURE — 21400001 HC TELEMETRY ROOM

## 2025-06-17 PROCEDURE — 63600175 PHARM REV CODE 636 W HCPCS: Mod: JZ,TB

## 2025-06-17 PROCEDURE — 27201423 OPTIME MED/SURG SUP & DEVICES STERILE SUPPLY: Performed by: SURGERY

## 2025-06-17 PROCEDURE — 36415 COLL VENOUS BLD VENIPUNCTURE: CPT | Performed by: SURGERY

## 2025-06-17 PROCEDURE — 63600175 PHARM REV CODE 636 W HCPCS: Performed by: ANESTHESIOLOGY

## 2025-06-17 PROCEDURE — 11000001 HC ACUTE MED/SURG PRIVATE ROOM

## 2025-06-17 PROCEDURE — 25000003 PHARM REV CODE 250

## 2025-06-17 PROCEDURE — 37000008 HC ANESTHESIA 1ST 15 MINUTES: Performed by: SURGERY

## 2025-06-17 PROCEDURE — 86901 BLOOD TYPING SEROLOGIC RH(D): CPT | Performed by: SURGERY

## 2025-06-17 RX ORDER — ACETAMINOPHEN 500 MG
1000 TABLET ORAL EVERY 8 HOURS
Status: DISCONTINUED | OUTPATIENT
Start: 2025-06-18 | End: 2025-06-18 | Stop reason: HOSPADM

## 2025-06-17 RX ORDER — KETOROLAC TROMETHAMINE 30 MG/ML
30 INJECTION, SOLUTION INTRAMUSCULAR; INTRAVENOUS EVERY 6 HOURS
Status: COMPLETED | OUTPATIENT
Start: 2025-06-17 | End: 2025-06-18

## 2025-06-17 RX ORDER — HYDROMORPHONE HYDROCHLORIDE 2 MG/ML
0.2 INJECTION, SOLUTION INTRAMUSCULAR; INTRAVENOUS; SUBCUTANEOUS EVERY 5 MIN PRN
Status: COMPLETED | OUTPATIENT
Start: 2025-06-17 | End: 2025-06-17

## 2025-06-17 RX ORDER — HYOSCYAMINE SULFATE 0.12 MG/1
0.12 TABLET SUBLINGUAL EVERY 4 HOURS PRN
Status: DISCONTINUED | OUTPATIENT
Start: 2025-06-17 | End: 2025-06-18 | Stop reason: HOSPADM

## 2025-06-17 RX ORDER — TRAMADOL HYDROCHLORIDE 50 MG/1
100 TABLET, FILM COATED ORAL EVERY 6 HOURS PRN
Status: DISCONTINUED | OUTPATIENT
Start: 2025-06-18 | End: 2025-06-18 | Stop reason: HOSPADM

## 2025-06-17 RX ORDER — ROCURONIUM BROMIDE 10 MG/ML
INJECTION, SOLUTION INTRAVENOUS
Status: DISCONTINUED | OUTPATIENT
Start: 2025-06-17 | End: 2025-06-17

## 2025-06-17 RX ORDER — CLONIDINE 0.2 MG/24H
1 PATCH, EXTENDED RELEASE TRANSDERMAL ONCE AS NEEDED
Status: DISCONTINUED | OUTPATIENT
Start: 2025-06-17 | End: 2025-06-18 | Stop reason: HOSPADM

## 2025-06-17 RX ORDER — ONDANSETRON 4 MG/1
4 TABLET, ORALLY DISINTEGRATING ORAL EVERY 4 HOURS PRN
Status: DISCONTINUED | OUTPATIENT
Start: 2025-06-17 | End: 2025-06-18 | Stop reason: HOSPADM

## 2025-06-17 RX ORDER — ONDANSETRON HYDROCHLORIDE 2 MG/ML
4 INJECTION, SOLUTION INTRAVENOUS EVERY 4 HOURS PRN
Status: DISCONTINUED | OUTPATIENT
Start: 2025-06-17 | End: 2025-06-18 | Stop reason: HOSPADM

## 2025-06-17 RX ORDER — BUPIVACAINE HYDROCHLORIDE 5 MG/ML
INJECTION, SOLUTION EPIDURAL; INTRACAUDAL; PERINEURAL
Status: DISCONTINUED | OUTPATIENT
Start: 2025-06-17 | End: 2025-06-17 | Stop reason: HOSPADM

## 2025-06-17 RX ORDER — ACETAMINOPHEN 10 MG/ML
1000 INJECTION, SOLUTION INTRAVENOUS EVERY 8 HOURS
Status: DISPENSED | OUTPATIENT
Start: 2025-06-17 | End: 2025-06-18

## 2025-06-17 RX ORDER — GLYCOPYRROLATE 0.2 MG/ML
INJECTION INTRAMUSCULAR; INTRAVENOUS
Status: DISCONTINUED | OUTPATIENT
Start: 2025-06-17 | End: 2025-06-17

## 2025-06-17 RX ORDER — MIDAZOLAM HYDROCHLORIDE 1 MG/ML
INJECTION INTRAMUSCULAR; INTRAVENOUS
Status: DISCONTINUED | OUTPATIENT
Start: 2025-06-17 | End: 2025-06-17

## 2025-06-17 RX ORDER — LABETALOL HYDROCHLORIDE 5 MG/ML
10 INJECTION, SOLUTION INTRAVENOUS
Status: DISCONTINUED | OUTPATIENT
Start: 2025-06-17 | End: 2025-06-18 | Stop reason: HOSPADM

## 2025-06-17 RX ORDER — PROCHLORPERAZINE EDISYLATE 5 MG/ML
5 INJECTION INTRAMUSCULAR; INTRAVENOUS EVERY 4 HOURS PRN
Status: DISCONTINUED | OUTPATIENT
Start: 2025-06-17 | End: 2025-06-18 | Stop reason: HOSPADM

## 2025-06-17 RX ORDER — CEFAZOLIN 2 G/1
2 INJECTION, POWDER, FOR SOLUTION INTRAMUSCULAR; INTRAVENOUS
Status: DISCONTINUED | OUTPATIENT
Start: 2025-06-17 | End: 2025-06-18 | Stop reason: HOSPADM

## 2025-06-17 RX ORDER — SODIUM CHLORIDE, SODIUM LACTATE, POTASSIUM CHLORIDE, CALCIUM CHLORIDE 600; 310; 30; 20 MG/100ML; MG/100ML; MG/100ML; MG/100ML
INJECTION, SOLUTION INTRAVENOUS CONTINUOUS
Status: DISCONTINUED | OUTPATIENT
Start: 2025-06-17 | End: 2025-06-18 | Stop reason: HOSPADM

## 2025-06-17 RX ORDER — FENTANYL CITRATE 50 UG/ML
INJECTION, SOLUTION INTRAMUSCULAR; INTRAVENOUS
Status: DISCONTINUED | OUTPATIENT
Start: 2025-06-17 | End: 2025-06-17

## 2025-06-17 RX ORDER — ACETAMINOPHEN 10 MG/ML
INJECTION, SOLUTION INTRAVENOUS
Status: DISCONTINUED | OUTPATIENT
Start: 2025-06-17 | End: 2025-06-17

## 2025-06-17 RX ORDER — SODIUM CHLORIDE, SODIUM LACTATE, POTASSIUM CHLORIDE, CALCIUM CHLORIDE 600; 310; 30; 20 MG/100ML; MG/100ML; MG/100ML; MG/100ML
INJECTION, SOLUTION INTRAVENOUS CONTINUOUS
Status: DISCONTINUED | OUTPATIENT
Start: 2025-06-17 | End: 2025-06-18

## 2025-06-17 RX ORDER — CEFAZOLIN 2 G/1
2 INJECTION, POWDER, FOR SOLUTION INTRAMUSCULAR; INTRAVENOUS
Status: COMPLETED | OUTPATIENT
Start: 2025-06-17 | End: 2025-06-18

## 2025-06-17 RX ORDER — VASOPRESSIN 20 [USP'U]/ML
INJECTION, SOLUTION INTRAMUSCULAR; SUBCUTANEOUS
Status: DISCONTINUED | OUTPATIENT
Start: 2025-06-17 | End: 2025-06-17

## 2025-06-17 RX ORDER — LIDOCAINE HYDROCHLORIDE 20 MG/ML
INJECTION, SOLUTION EPIDURAL; INFILTRATION; INTRACAUDAL; PERINEURAL
Status: DISCONTINUED | OUTPATIENT
Start: 2025-06-17 | End: 2025-06-17

## 2025-06-17 RX ORDER — ONDANSETRON HYDROCHLORIDE 2 MG/ML
INJECTION, SOLUTION INTRAVENOUS
Status: DISCONTINUED | OUTPATIENT
Start: 2025-06-17 | End: 2025-06-17

## 2025-06-17 RX ORDER — ENOXAPARIN SODIUM 100 MG/ML
40 INJECTION SUBCUTANEOUS
Status: DISCONTINUED | OUTPATIENT
Start: 2025-06-17 | End: 2025-06-18 | Stop reason: HOSPADM

## 2025-06-17 RX ORDER — GLUCAGON 1 MG
1 KIT INJECTION
Status: DISCONTINUED | OUTPATIENT
Start: 2025-06-17 | End: 2025-06-17 | Stop reason: HOSPADM

## 2025-06-17 RX ORDER — DEXAMETHASONE SODIUM PHOSPHATE 4 MG/ML
INJECTION, SOLUTION INTRA-ARTICULAR; INTRALESIONAL; INTRAMUSCULAR; INTRAVENOUS; SOFT TISSUE
Status: DISCONTINUED | OUTPATIENT
Start: 2025-06-17 | End: 2025-06-17

## 2025-06-17 RX ORDER — ALBUTEROL SULFATE AND BUDESONIDE 90; 80 UG/1; UG/1
2 AEROSOL, METERED RESPIRATORY (INHALATION)
COMMUNITY

## 2025-06-17 RX ORDER — ENOXAPARIN SODIUM 100 MG/ML
40 INJECTION SUBCUTANEOUS EVERY 12 HOURS
Status: DISCONTINUED | OUTPATIENT
Start: 2025-06-17 | End: 2025-06-18 | Stop reason: HOSPADM

## 2025-06-17 RX ORDER — BUPIVACAINE 13.3 MG/ML
INJECTION, SUSPENSION, LIPOSOMAL INFILTRATION
Status: DISCONTINUED | OUTPATIENT
Start: 2025-06-17 | End: 2025-06-17 | Stop reason: HOSPADM

## 2025-06-17 RX ORDER — PROPOFOL 10 MG/ML
VIAL (ML) INTRAVENOUS
Status: DISCONTINUED | OUTPATIENT
Start: 2025-06-17 | End: 2025-06-17

## 2025-06-17 RX ORDER — HYDRALAZINE HYDROCHLORIDE 20 MG/ML
10 INJECTION INTRAMUSCULAR; INTRAVENOUS
Status: DISCONTINUED | OUTPATIENT
Start: 2025-06-17 | End: 2025-06-18 | Stop reason: HOSPADM

## 2025-06-17 RX ORDER — PHENYLEPHRINE HYDROCHLORIDE 10 MG/ML
INJECTION INTRAVENOUS
Status: DISCONTINUED | OUTPATIENT
Start: 2025-06-17 | End: 2025-06-17

## 2025-06-17 RX ADMIN — KETOROLAC TROMETHAMINE 30 MG: 30 INJECTION, SOLUTION INTRAMUSCULAR; INTRAVENOUS at 12:06

## 2025-06-17 RX ADMIN — ONDANSETRON 4 MG: 2 INJECTION INTRAMUSCULAR; INTRAVENOUS at 10:06

## 2025-06-17 RX ADMIN — FENTANYL CITRATE 25 MCG: 50 INJECTION, SOLUTION INTRAMUSCULAR; INTRAVENOUS at 10:06

## 2025-06-17 RX ADMIN — PROCHLORPERAZINE EDISYLATE 5 MG: 5 INJECTION INTRAMUSCULAR; INTRAVENOUS at 11:06

## 2025-06-17 RX ADMIN — VASOPRESSIN 1 UNITS: 20 INJECTION INTRAVENOUS at 10:06

## 2025-06-17 RX ADMIN — SUGAMMADEX 200 MG: 100 INJECTION, SOLUTION INTRAVENOUS at 10:06

## 2025-06-17 RX ADMIN — KETOROLAC TROMETHAMINE 30 MG: 30 INJECTION, SOLUTION INTRAMUSCULAR; INTRAVENOUS at 06:06

## 2025-06-17 RX ADMIN — ONDANSETRON 4 MG: 2 INJECTION INTRAMUSCULAR; INTRAVENOUS at 09:06

## 2025-06-17 RX ADMIN — SODIUM CHLORIDE, POTASSIUM CHLORIDE, SODIUM LACTATE AND CALCIUM CHLORIDE: 600; 310; 30; 20 INJECTION, SOLUTION INTRAVENOUS at 12:06

## 2025-06-17 RX ADMIN — SODIUM CHLORIDE, SODIUM GLUCONATE, SODIUM ACETATE, POTASSIUM CHLORIDE AND MAGNESIUM CHLORIDE: 526; 502; 368; 37; 30 INJECTION, SOLUTION INTRAVENOUS at 09:06

## 2025-06-17 RX ADMIN — HYDROMORPHONE HYDROCHLORIDE 0.2 MG: 2 INJECTION, SOLUTION INTRAMUSCULAR; INTRAVENOUS; SUBCUTANEOUS at 11:06

## 2025-06-17 RX ADMIN — FENTANYL CITRATE 50 MCG: 50 INJECTION, SOLUTION INTRAMUSCULAR; INTRAVENOUS at 10:06

## 2025-06-17 RX ADMIN — HYDROMORPHONE HYDROCHLORIDE 0.2 MG: 2 INJECTION, SOLUTION INTRAMUSCULAR; INTRAVENOUS; SUBCUTANEOUS at 12:06

## 2025-06-17 RX ADMIN — LIDOCAINE HYDROCHLORIDE 80 MG: 20 INJECTION, SOLUTION EPIDURAL; INFILTRATION; INTRACAUDAL; PERINEURAL at 09:06

## 2025-06-17 RX ADMIN — PROPOFOL 180 MG: 10 INJECTION, EMULSION INTRAVENOUS at 09:06

## 2025-06-17 RX ADMIN — GLYCOPYRROLATE 0.2 MG: 0.2 INJECTION INTRAMUSCULAR; INTRAVENOUS at 09:06

## 2025-06-17 RX ADMIN — SODIUM CHLORIDE, POTASSIUM CHLORIDE, SODIUM LACTATE AND CALCIUM CHLORIDE: 600; 310; 30; 20 INJECTION, SOLUTION INTRAVENOUS at 04:06

## 2025-06-17 RX ADMIN — ENOXAPARIN SODIUM 40 MG: 40 INJECTION SUBCUTANEOUS at 09:06

## 2025-06-17 RX ADMIN — DEXAMETHASONE SODIUM PHOSPHATE 8 MG: 4 INJECTION, SOLUTION INTRA-ARTICULAR; INTRALESIONAL; INTRAMUSCULAR; INTRAVENOUS; SOFT TISSUE at 09:06

## 2025-06-17 RX ADMIN — PANTOPRAZOLE SODIUM 40 MG: 40 INJECTION, POWDER, FOR SOLUTION INTRAVENOUS at 10:06

## 2025-06-17 RX ADMIN — PHENYLEPHRINE HYDROCHLORIDE 200 MCG: 10 INJECTION INTRAVENOUS at 10:06

## 2025-06-17 RX ADMIN — CEFAZOLIN 2 G: 2 INJECTION, POWDER, FOR SOLUTION INTRAMUSCULAR; INTRAVENOUS at 06:06

## 2025-06-17 RX ADMIN — PHENYLEPHRINE HYDROCHLORIDE 100 MCG: 10 INJECTION INTRAVENOUS at 10:06

## 2025-06-17 RX ADMIN — MIDAZOLAM HYDROCHLORIDE 2 MG: 1 INJECTION, SOLUTION INTRAMUSCULAR; INTRAVENOUS at 09:06

## 2025-06-17 RX ADMIN — ENOXAPARIN SODIUM 40 MG: 40 INJECTION SUBCUTANEOUS at 08:06

## 2025-06-17 RX ADMIN — ACETAMINOPHEN 1000 MG: 10 INJECTION, SOLUTION INTRAVENOUS at 10:06

## 2025-06-17 RX ADMIN — SODIUM CHLORIDE, SODIUM GLUCONATE, SODIUM ACETATE, POTASSIUM CHLORIDE AND MAGNESIUM CHLORIDE: 526; 502; 368; 37; 30 INJECTION, SOLUTION INTRAVENOUS at 10:06

## 2025-06-17 RX ADMIN — ROCURONIUM BROMIDE 50 MG: 10 SOLUTION INTRAVENOUS at 09:06

## 2025-06-17 RX ADMIN — ROCURONIUM BROMIDE 10 MG: 10 SOLUTION INTRAVENOUS at 10:06

## 2025-06-17 RX ADMIN — ACETAMINOPHEN 1000 MG: 10 INJECTION, SOLUTION INTRAVENOUS at 09:06

## 2025-06-17 RX ADMIN — HYDROMORPHONE HYDROCHLORIDE 0.2 MG: 2 INJECTION, SOLUTION INTRAMUSCULAR; INTRAVENOUS; SUBCUTANEOUS at 10:06

## 2025-06-17 RX ADMIN — FENTANYL CITRATE 100 MCG: 50 INJECTION, SOLUTION INTRAMUSCULAR; INTRAVENOUS at 09:06

## 2025-06-17 RX ADMIN — CEFAZOLIN 2 G: 2 INJECTION, POWDER, FOR SOLUTION INTRAMUSCULAR; INTRAVENOUS at 09:06

## 2025-06-17 NOTE — TRANSFER OF CARE
"Anesthesia Transfer of Care Note    Patient: Perla Vu    Procedure(s) Performed: Procedure(s) (LRB):  GASTRECTOMY, SLEEVE, LAPAROSCOPIC (N/A)    Patient location: PACU    Anesthesia Type: general    Transport from OR: Transported from OR on room air with adequate spontaneous ventilation    Post pain: adequate analgesia    Post assessment: no apparent anesthetic complications and tolerated procedure well    Post vital signs: stable    Level of consciousness: awake, alert and oriented    Nausea/Vomiting: no nausea/vomiting    Complications: none    Transfer of care protocol was followed      Last vitals: Visit Vitals  /89   Pulse 62   Temp 36.9 °C (98.4 °F) (Oral)   Resp 16   Ht 5' 6" (1.676 m)   Wt 120.7 kg (266 lb)   LMP 06/17/2025 (Exact Date)   SpO2 98%   Breastfeeding No   BMI 43.56 kg/m²     "

## 2025-06-17 NOTE — ANESTHESIA PREPROCEDURE EVALUATION
06/17/2025  Perla Vu is a 29 y.o., female.  Pseudotumor cerebri  Idiopathic intracranial hypertension  Eosinophilic esophagitis  GERD  Asthma      Pre-op Assessment    I have reviewed the Patient Summary Reports.     I have reviewed the Nursing Notes. I have reviewed the NPO Status.   I have reviewed the Medications.     Review of Systems  Anesthesia Hx:  No problems with previous Anesthesia                Social:  Non-Smoker       Cardiovascular:  Cardiovascular Normal                                              Pulmonary:    Asthma mild                   Hepatic/GI:     GERD, well controlled                Neurological:  Denies TIA.  Denies CVA.   Headaches (Migraines) Denies Seizures.    Pseudotumor cerebri  Idiopathic intracranial hypertension  Papilledema                            Endocrine:  Denies Diabetes.         Obesity / BMI > 30 (Class 3), Morbid Obesity / BMI > 40      Physical Exam  General: Cooperative, Alert and Oriented    Airway:  Mallampati: I   Mouth Opening: Normal  TM Distance: Normal  Tongue: Normal  Neck ROM: Normal ROM    Dental:  Intact    Chest/Lungs:  Clear to auscultation, Normal Respiratory Rate    Heart:  Rate: Normal  Rhythm: Regular Rhythm        Anesthesia Plan  Type of Anesthesia, risks & benefits discussed:    Anesthesia Type: Gen ETT  Intra-op Monitoring Plan: Standard ASA Monitors  Post Op Pain Control Plan: multimodal analgesia and IV/PO Opioids PRN  Airway Plan: Direct, Post-Induction  Informed Consent: Patient consented to blood products? Yes  ASA Score: 2  Day of Surgery Review of History & Physical: H&P Update referred to the surgeon/provider.    Ready For Surgery From Anesthesia Perspective.     .

## 2025-06-17 NOTE — ANESTHESIA PROCEDURE NOTES
Intubation    Date/Time: 6/17/2025 9:36 AM    Performed by: Delma Izquierdo CRNA  Authorized by: Uday Juarez MD    Intubation:     Induction:  Intravenous    Intubated:  Postinduction    Mask Ventilation:  Easy mask    Attempts:  1    Attempted By:  CRNA    Method of Intubation:  Video laryngoscopy    Blade:  Corona 3    Laryngeal View Grade: Grade I - full view of cords      Difficult Airway Encountered?: No      Complications:  None    Airway Device:  Oral endotracheal tube    Airway Device Size:  7.0    Style/Cuff Inflation:  Cuffed (inflated to minimal occlusive pressure)    Tube secured:  22    Secured at:  The lips    Placement Verified By:  Capnometry    Complicating Factors:  None    Findings Post-Intubation:  BS equal bilateral and atraumatic/condition of teeth unchanged

## 2025-06-17 NOTE — OP NOTE
Patient:  Perla Vu        :  1995            DATE OF SURGERY:     2025          SURGEON:  Miky Whitehead MD         ASSISTANT:  Courtney LaFluer, NP (First Assistant)           PREOPERATIVE DIAGNOSIS:  Morbid obesity.           POSTOPERATIVE DIAGNOSIS:  Same.           OPERATIONS:  Laparoscopic vertical sleeve gastrectomy with exparel tap block .            Anesthesia:  General endotracheal anesthesia.      Estimated blood loss:  Less than 50 cc.      Blood administered:  None.      Lap and instrument counts correct x 2 at the end of the case.     Specimen: Lateral Remnant Stomach           INDICATIONS/SIGNIFICANT HISTORY:  The patient is a 29 y.o. year old female who was unable to achieve sustainable weight loss and improve co-morbid conditions with self dieting and exercise.  The patient attended a weight loss seminar and was interested in laparoscopic vertical sleeve gastrectomy.  Once the patient was cleared medically and attended pre-operative classes, the risks and benefits of surgery were discussed with the patient.  The patient voiced understanding of risks and benefits and elected to proceed with surgery.           PROCEDURE IN DETAIL:  Once informed consents were obtained, the patient was taken to the operating room and placed supine on the operating table.  After general endotracheal anesthesia was induced, the abdomen was prepped and draped in a standard surgical fashion.  A left paramedian incision was made with a scalpel, upon which the 11 mm Visiport trocar was used to enter the abdominal cavity.  A pneumoperitoneum was then created with insufflation.  After adequate insufflation was obtained, two additional trocar ports were placed in the right upper quadrant (12mm and 15mm), followed by two additional 5mm working trocar ports placed in the left upper quadrant.  A Frank liver retractor was placed subxiphoid for retraction of the liver superiorly.  Attention was then  redirected to the gastric hiatus.  No hiatal hernia was appreciated.  The short gastric vessels were then taken down starting 6 cm proximal to the pylorus, up to the left rose of the diaphragm.  Once this was performed, a 34 Romansh blunt-tipped bougie was then placed in the distal portion of the stomach.  Using this as a guide, the stomach was stapled up to the left rose of the diaphragm using 3 green loads then blue loads.  Particular note was made not to narrow the angular incisura.  A small portion of the fundus was left purposefully near the GE junction during the stapling procedure.  The staple line was then visualized and appeared secure and intact with no active bleeding noted.  The remnant of the stomach was removed through the right paramedian trocar port and passed off the table as specimen.  Vistaseal was then placed along the staple line for added hemostasis.  The liver retractor was then removed.  The extraction port site abdominal fascia was closed with 0 Vicryl suture in interrupted fashion using a suture passer.  A tap block was then performed under direct visualization to bilateral sides.  The pneumoperitoneum was then evacuated and all ports were removed with no active bleeding noted.  The skin was then closed with a 4-0 Vicryl suture in interrupted fashion.  The skin was cleansed and dried, and a dry sterile dressing was placed on the wound.  Lap and instrument counts were correct x 2 at the end of the case. Tania Avalos was present during the entirety of the case and was critical in retraction for proper dissection.  There was no qualified resident available for assisting during this procedure.  The patient tolerated the procedure well and was transported to the recovery room in good condition.

## 2025-06-18 VITALS
OXYGEN SATURATION: 97 % | SYSTOLIC BLOOD PRESSURE: 117 MMHG | HEIGHT: 66 IN | TEMPERATURE: 98 F | DIASTOLIC BLOOD PRESSURE: 84 MMHG | RESPIRATION RATE: 16 BRPM | HEART RATE: 66 BPM | BODY MASS INDEX: 43.36 KG/M2 | WEIGHT: 269.81 LBS

## 2025-06-18 PROBLEM — E66.01 MORBID OBESITY: Status: ACTIVE | Noted: 2025-06-18

## 2025-06-18 LAB
ANION GAP SERPL CALC-SCNC: 7 MEQ/L
BASOPHILS # BLD AUTO: 0 X10(3)/MCL
BASOPHILS NFR BLD AUTO: 0 %
BUN SERPL-MCNC: 11.5 MG/DL (ref 7–18.7)
CALCIUM SERPL-MCNC: 8 MG/DL (ref 8.4–10.2)
CHLORIDE SERPL-SCNC: 110 MMOL/L (ref 98–107)
CO2 SERPL-SCNC: 20 MMOL/L (ref 22–29)
CREAT SERPL-MCNC: 0.75 MG/DL (ref 0.55–1.02)
CREAT/UREA NIT SERPL: 15
EOSINOPHIL # BLD AUTO: 0.01 X10(3)/MCL (ref 0–0.9)
EOSINOPHIL NFR BLD AUTO: 0.2 %
ERYTHROCYTE [DISTWIDTH] IN BLOOD BY AUTOMATED COUNT: 14.9 % (ref 11.5–17)
GFR SERPLBLD CREATININE-BSD FMLA CKD-EPI: >60 ML/MIN/1.73/M2
GLUCOSE SERPL-MCNC: 85 MG/DL (ref 74–100)
HCT VFR BLD AUTO: 35.6 % (ref 37–47)
HGB BLD-MCNC: 11.4 G/DL (ref 12–16)
IMM GRANULOCYTES # BLD AUTO: 0.01 X10(3)/MCL (ref 0–0.04)
IMM GRANULOCYTES NFR BLD AUTO: 0.2 %
LYMPHOCYTES # BLD AUTO: 0.96 X10(3)/MCL (ref 0.6–4.6)
LYMPHOCYTES NFR BLD AUTO: 17.2 %
MCH RBC QN AUTO: 29.8 PG (ref 27–31)
MCHC RBC AUTO-ENTMCNC: 32 G/DL (ref 33–36)
MCV RBC AUTO: 93.2 FL (ref 80–94)
MONOCYTES # BLD AUTO: 0.47 X10(3)/MCL (ref 0.1–1.3)
MONOCYTES NFR BLD AUTO: 8.4 %
NEUTROPHILS # BLD AUTO: 4.13 X10(3)/MCL (ref 2.1–9.2)
NEUTROPHILS NFR BLD AUTO: 74 %
NRBC BLD AUTO-RTO: 0 %
PLATELET # BLD AUTO: 141 X10(3)/MCL (ref 130–400)
PLATELETS.RETICULATED NFR BLD AUTO: 4.9 % (ref 0.9–11.2)
PMV BLD AUTO: 12.3 FL (ref 7.4–10.4)
POTASSIUM SERPL-SCNC: 4.4 MMOL/L (ref 3.5–5.1)
RBC # BLD AUTO: 3.82 X10(6)/MCL (ref 4.2–5.4)
SODIUM SERPL-SCNC: 137 MMOL/L (ref 136–145)
WBC # BLD AUTO: 5.58 X10(3)/MCL (ref 4.5–11.5)

## 2025-06-18 PROCEDURE — 85025 COMPLETE CBC W/AUTO DIFF WBC: CPT

## 2025-06-18 PROCEDURE — 63600175 PHARM REV CODE 636 W HCPCS

## 2025-06-18 PROCEDURE — 80048 BASIC METABOLIC PNL TOTAL CA: CPT

## 2025-06-18 PROCEDURE — 36415 COLL VENOUS BLD VENIPUNCTURE: CPT

## 2025-06-18 PROCEDURE — 25000003 PHARM REV CODE 250

## 2025-06-18 RX ORDER — ONDANSETRON 4 MG/1
4 TABLET, ORALLY DISINTEGRATING ORAL EVERY 6 HOURS PRN
Qty: 10 TABLET | Refills: 0 | Status: SHIPPED | OUTPATIENT
Start: 2025-06-18

## 2025-06-18 RX ORDER — TRAMADOL HYDROCHLORIDE 50 MG/1
50 TABLET, FILM COATED ORAL EVERY 6 HOURS PRN
Qty: 20 TABLET | Refills: 0 | Status: SHIPPED | OUTPATIENT
Start: 2025-06-18

## 2025-06-18 RX ADMIN — ENOXAPARIN SODIUM 40 MG: 40 INJECTION SUBCUTANEOUS at 09:06

## 2025-06-18 RX ADMIN — CEFAZOLIN 2 G: 2 INJECTION, POWDER, FOR SOLUTION INTRAMUSCULAR; INTRAVENOUS at 02:06

## 2025-06-18 RX ADMIN — ACETAMINOPHEN 1000 MG: 500 TABLET ORAL at 02:06

## 2025-06-18 RX ADMIN — ACETAMINOPHEN 1000 MG: 10 INJECTION, SOLUTION INTRAVENOUS at 05:06

## 2025-06-18 RX ADMIN — PANTOPRAZOLE SODIUM 40 MG: 40 INJECTION, POWDER, FOR SOLUTION INTRAVENOUS at 09:06

## 2025-06-18 RX ADMIN — KETOROLAC TROMETHAMINE 30 MG: 30 INJECTION, SOLUTION INTRAMUSCULAR; INTRAVENOUS at 09:06

## 2025-06-18 RX ADMIN — SODIUM CHLORIDE, POTASSIUM CHLORIDE, SODIUM LACTATE AND CALCIUM CHLORIDE: 600; 310; 30; 20 INJECTION, SOLUTION INTRAVENOUS at 12:06

## 2025-06-18 RX ADMIN — KETOROLAC TROMETHAMINE 30 MG: 30 INJECTION, SOLUTION INTRAMUSCULAR; INTRAVENOUS at 12:06

## 2025-06-18 NOTE — PROGRESS NOTES
POD 1 s/p LVSG    Pt doing well, resting in bed comfortably. States she had some nausea yesterday but none this morning    VSS, AF    Physical Exam:  CV: RRR  CH: NWB on RA  ABD: soft, glory ttp, incisions c/d/i     POD1:  Doing well   - bariatric clear liquid diet  - continue IVFs  - ambulate  - incentive spirometer     Fabian Valerio MD  [facility] General Surgery PGY-1

## 2025-06-18 NOTE — DISCHARGE SUMMARY
Patient: Perla Vu    Date: 6/18/2025    Admit Diagnosis: Morbid Obesity    Discharge Diagnosis: Morbid Obesity    Operation: Laparoscopic Vertical Sleeve Gastrectomy     Hospital Course: 29 y.o. old female POD1 s/p LVSG is currently tolerating PO without any nausea / emesis. Doing well, planned for discharge.     Disposition: Home    Meds: The patient is being discharged with Zofran, Tramadol    Wounds: May shower starting tomorrow, no tub bath / soaking x 2 weeks    F/U - 2 weeks with Dr. Whitehead    Diet: Post Bariatric      Fabian Valerio MD  [facility] General Surgery PGY-1

## 2025-06-18 NOTE — PLAN OF CARE
Problem: Adult Inpatient Plan of Care  Goal: Plan of Care Review  Outcome: Progressing  Goal: Patient-Specific Goal (Individualized)  Outcome: Progressing  Goal: Absence of Hospital-Acquired Illness or Injury  Outcome: Progressing  Intervention: Identify and Manage Fall Risk  Flowsheets (Taken 6/18/2025 0124)  Safety Promotion/Fall Prevention:   assistive device/personal item within reach   nonskid shoes/socks when out of bed  Intervention: Prevent and Manage VTE (Venous Thromboembolism) Risk  Flowsheets (Taken 6/18/2025 0124)  VTE Prevention/Management:   remove, assess skin, and reapply sequential compression device   intravenous hydration   ambulation promoted  Intervention: Prevent Infection  Flowsheets (Taken 6/18/2025 0124)  Infection Prevention: hand hygiene promoted  Goal: Optimal Comfort and Wellbeing  Outcome: Progressing  Goal: Readiness for Transition of Care  Outcome: Progressing     Problem: Bariatric Environmental Safety  Goal: Safety Maintained with Care  Outcome: Progressing     Problem: Wound  Goal: Optimal Coping  Outcome: Progressing  Goal: Optimal Functional Ability  Outcome: Progressing  Goal: Absence of Infection Signs and Symptoms  Outcome: Progressing  Goal: Improved Oral Intake  Outcome: Progressing  Goal: Optimal Pain Control and Function  Outcome: Progressing  Goal: Skin Health and Integrity  Outcome: Progressing  Intervention: Optimize Skin Protection  Flowsheets (Taken 6/18/2025 0124)  Pressure Reduction Techniques: frequent weight shift encouraged  Activity Management: Ambulated -L4  Goal: Optimal Wound Healing  Outcome: Progressing  Intervention: Promote Wound Healing  Flowsheets (Taken 6/18/2025 0124)  Sleep/Rest Enhancement:   awakenings minimized   room darkened     Problem: Fall Injury Risk  Goal: Absence of Fall and Fall-Related Injury  Outcome: Progressing  Intervention: Promote Injury-Free Environment  Flowsheets (Taken 6/18/2025 0124)  Safety Promotion/Fall Prevention:    assistive device/personal item within reach   nonskid shoes/socks when out of bed     Problem: Surgical Site Infection  Goal: Absence of Infection Signs and Symptoms  Outcome: Progressing  Intervention: Prevent or Manage Infection  Flowsheets (Taken 6/18/2025 0124)  Fluid/Electrolyte Management: intravenous fluid replacement initiated     Problem: Pain Acute  Goal: Optimal Pain Control and Function  Intervention: Develop Pain Management Plan  Flowsheets (Taken 6/18/2025 0124)  Pain Management Interventions:   care clustered   quiet environment facilitated   pain management plan reviewed with patient/caregiver

## 2025-06-18 NOTE — PLAN OF CARE
Problem: Adult Inpatient Plan of Care  Goal: Plan of Care Review  6/18/2025 1730 by Kavya Chavez RN  Outcome: Met  6/18/2025 1054 by Kavya Chavez RN  Outcome: Progressing  Goal: Patient-Specific Goal (Individualized)  6/18/2025 1730 by Kavya Chavez RN  Outcome: Met  6/18/2025 1054 by Kavya Chavez RN  Outcome: Progressing  Goal: Absence of Hospital-Acquired Illness or Injury  6/18/2025 1730 by Kavya Chavez RN  Outcome: Met  6/18/2025 1054 by Kavya Chavez RN  Outcome: Progressing  Goal: Optimal Comfort and Wellbeing  6/18/2025 1730 by Kavya Chavez RN  Outcome: Met  6/18/2025 1054 by Kavya Chavez RN  Outcome: Progressing  Goal: Readiness for Transition of Care  6/18/2025 1730 by Kavya Chavez RN  Outcome: Met  6/18/2025 1054 by Kavya Chavez RN  Outcome: Progressing     Problem: Bariatric Environmental Safety  Goal: Safety Maintained with Care  6/18/2025 1730 by Kavya Chavez RN  Outcome: Met  6/18/2025 1054 by Kavya Chavez RN  Outcome: Progressing     Problem: Wound  Goal: Optimal Coping  6/18/2025 1730 by Kavya Chavez RN  Outcome: Met  6/18/2025 1054 by Kavya Chavez RN  Outcome: Progressing  Goal: Optimal Functional Ability  6/18/2025 1730 by Kavya Chavez RN  Outcome: Met  6/18/2025 1054 by Kavya Chavez RN  Outcome: Progressing  Goal: Absence of Infection Signs and Symptoms  6/18/2025 1730 by Kavya Chavez RN  Outcome: Met  6/18/2025 1054 by Kavya Chavez RN  Outcome: Progressing  Goal: Improved Oral Intake  6/18/2025 1730 by Kavya Chavez RN  Outcome: Met  6/18/2025 1054 by Kavya Chavez RN  Outcome: Progressing  Goal: Optimal Pain Control and Function  6/18/2025 1730 by Kavya Chavez RN  Outcome: Met  6/18/2025 1054 by Kavya Chavez RN  Outcome: Progressing  Goal: Skin Health and Integrity  6/18/2025 1730 by Kavya Chavez RN  Outcome: Met  6/18/2025 1054 by Kavya Chavez RN  Outcome: Progressing  Goal: Optimal Wound Healing  6/18/2025 1730 by Kavya Chavez RN  Outcome: Met  6/18/2025 1054 by Kavya Chavez RN  Outcome:  Progressing     Problem: Fall Injury Risk  Goal: Absence of Fall and Fall-Related Injury  6/18/2025 1730 by Kavya Chavez RN  Outcome: Met  6/18/2025 1054 by Kavya Chavez RN  Outcome: Progressing     Problem: Surgical Site Infection  Goal: Absence of Infection Signs and Symptoms  6/18/2025 1730 by Kavya Chavez RN  Outcome: Met  6/18/2025 1054 by Kavya Chavez RN  Outcome: Progressing     Problem: Pain Acute  Goal: Optimal Pain Control and Function  6/18/2025 1730 by Kavya Chavez RN  Outcome: Met  6/18/2025 1054 by Kavya Chavez RN  Outcome: Progressing

## 2025-06-18 NOTE — PLAN OF CARE
Problem: Adult Inpatient Plan of Care  Goal: Plan of Care Review  Outcome: Progressing  Goal: Patient-Specific Goal (Individualized)  Outcome: Progressing  Goal: Absence of Hospital-Acquired Illness or Injury  Outcome: Progressing  Goal: Optimal Comfort and Wellbeing  Outcome: Progressing  Goal: Readiness for Transition of Care  Outcome: Progressing     Problem: Bariatric Environmental Safety  Goal: Safety Maintained with Care  Outcome: Progressing     Problem: Wound  Goal: Optimal Coping  Outcome: Progressing  Goal: Optimal Functional Ability  Outcome: Progressing  Goal: Absence of Infection Signs and Symptoms  Outcome: Progressing  Goal: Improved Oral Intake  Outcome: Progressing  Goal: Optimal Pain Control and Function  Outcome: Progressing  Goal: Skin Health and Integrity  Outcome: Progressing  Goal: Optimal Wound Healing  Outcome: Progressing     Problem: Fall Injury Risk  Goal: Absence of Fall and Fall-Related Injury  Outcome: Progressing     Problem: Surgical Site Infection  Goal: Absence of Infection Signs and Symptoms  Outcome: Progressing     Problem: Pain Acute  Goal: Optimal Pain Control and Function  Outcome: Progressing

## 2025-06-18 NOTE — CONSULTS
"  [facility] - 8th Floor Med Surg  Adult Nutrition  Consult Note    SUMMARY     Recommendations  Recommendation/Intervention: Follow post bariatric protocol for nutrition, hydration, exercise, and vitamin regimen.  Goals: Follow protocol as instructed by bariatric team.  Nutrition Goal Status: progressing towards goal  Communication of RD Recs: discussed on rounds    Nutrition Discharge Planning   Nutrition Discharge Planning: Oral supplement regimen (comments)  Oral supplement regimen (comments): Drink 2.5 30 gram protein shakes daily per bariatric team recommendations.    Assessment and Plan  No new Assessment & Plan notes have been filed under this hospital service since the last note was generated.  Service: Nutrition     Malnutrition Assessment  Pt does not meet criteria for malnutrition.    Reason for Assessment  Reason For Assessment: consult  Diagnosis: other (see comments) (s/p VSG)  General Information Comments: Pt was educated on post bariatric protocol for nutrition, hydration, exercise, and vitamin regimen.    Nutrition/Diet History  Spiritual, Cultural Beliefs, [employer] Practices, Values that Affect Care: no  Factors Affecting Nutritional Intake: decreased appetite, dry mouth, early satiety    Anthropometrics  Height: 5' 6" (167.6 cm)  Height (inches): 66 in  Height Method: Stated  Weight: 122.4 kg (269 lb 13.5 oz)  Weight (lb): 269.85 lb  Weight Method: Standard Scale  Ideal Body Weight (IBW), Female: 130 lb  % Ideal Body Weight, Female (lb): 207.58 %  BMI (Calculated): 43.6    Lab/Procedures/Meds  Pertinent Labs Reviewed: reviewed  Pertinent Medications Reviewed: reviewed    Estimated/Assessed Needs  Weight Used For Calorie Calculations: 74 kg (163 lb 2.3 oz)  Energy Calorie Requirements (kcal): 7833-2866  Energy Need Method: Kcal/kg  Protein Requirements: 74-82  Weight Used For Protein Calculations: 74 kg (163 lb 2.3 oz)  Fluid Requirements (mL): 2448  Estimated Fluid Requirement " Method: other (see comments) (actual bw in kg x 20)  RDA Method (mL): 1480    Nutrition Prescription Ordered  Current Diet Order: NPO (Will progress to bariatric clear liquid upon surgeon's orders)  Nutrition Order Comments: Follow diet protocol as instructed by bariatric team.    Evaluation of Received Nutrient/Fluid Intake  Energy Calories Required: not meeting needs (as expected.)  Protein Required: not meeting needs (as expected.)  Fluid Required: meeting needs  % Intake of Estimated Energy Needs: 0 - 25 %  % Meal Intake: 0 - 25 %    Nutrition Risk  Level of Risk/Frequency of Follow-up: low     Monitor and Evaluation  Monitor and Evaluation: Food and nutrition knowledge     Nutrition Follow-Up  RD Follow-up?: Yes (RD will f/u with pt in 2 weeks to progress diet.)

## 2025-06-18 NOTE — ANESTHESIA POSTPROCEDURE EVALUATION
Anesthesia Post Evaluation    Patient: Perla Vu    Procedure(s) Performed: Procedure(s) (LRB):  GASTRECTOMY, SLEEVE, LAPAROSCOPIC (N/A)    Final Anesthesia Type: general      Patient location during evaluation: PACU  Patient participation: Yes- Able to Participate  Level of consciousness: awake and alert  Post-procedure vital signs: reviewed and stable  Pain management: adequate  Airway patency: patent    PONV status at discharge: No PONV  Anesthetic complications: no      Cardiovascular status: hemodynamically stable  Respiratory status: spontaneous ventilation and room air  Hydration status: euvolemic  Follow-up not needed.              Vitals Value Taken Time   BP 97/63 06/17/25 15:02   Temp 36.7 °C (98 °F) 06/17/25 10:48   Pulse 73 06/17/25 15:39   Resp 17 06/17/25 15:05   SpO2 94 % 06/17/25 15:39   Vitals shown include unfiled device data.      Event Time   Out of Recovery 15:40:00         Pain/Avinash Score: Pain Rating Prior to Med Admin: 3 (6/18/2025  5:02 AM)  Pain Rating Post Med Admin: 2 (6/18/2025  1:18 AM)  Avinash Score: 10 (6/17/2025  3:40 PM)

## 2025-06-18 NOTE — DISCHARGE INSTRUCTIONS
HOSPITAL DISCHARGE INSTRUCTIONS    Clinic Phone Numbers       Surgeons office number and after hours on call surgeon: [phone].    ALWAYS call the surgeons office PRIOR to going to an Urgent Care or the emergency room. If medical emergency, call 911.     Signs and Symptoms that would warrant a phone call of the office (regardless of the time of day):     Fever greater than 101 F     Uncontrolled pain that does not improve with pain medication     Uncontrolled nausea that does not improve with nausea medication      Vomiting     Shortness of breath     Chest Pain     Foul smelling drainage from incision and/or yellow or green drainage from incision     Red, hot painful incisions     Bloody bowel movements     ** If you feel as though it is a life-threatening emergency, call 911 and go to the emergency room**          Prescriptions     Medications     Pain medication (if needed) Tylenol over the counter is safe to take for discomfort     Anti-nausea medication (if needed)     Proton Pump Inhibitor (finish all 30 days), call [phone] if you did not receive 30 days of medication       Supplements     Chewable multivitamin- take 2 times a day (unless otherwise directed)     Chewable Calcium Citrate with D3- take 3 times a day (unless otherwise directed)     Iron tablet- take once daily      MiraLAX- take once daily for 2 weeks       Home Medications     Please review your medication list that you received at pre-op class as well as at the hospital instructions upon discharge to assure you are resuming all medications that are deemed  safe after surgery.      ** REMINDER- You should have scheduled your follow-up with your prescribing doctor for 1-week post-op**          Appointments     Surgeons Post-op Visits- please review orange sheet you received in the mail after surgery. Call [phone] if you need to reschedule your surgeons post-op visit.      Bariatric Team Post-op Visits- please review blue sheet  you received in your e-mail or please reference [facility] for your post-op appointments. . Call [phone] option 1 if you need to reschedule your bariatric team post-op visit.          Nutritional Considerations     Hydration is CRITICAL!     Daily fluid intake of  oz water     Water is more important than protein      Review list of allowable and non-allowable liquids in your Bariatric Booklet    The only fluids that count towards your water goal is water, sugar free, caffeine free flavored water        Diet progression          Continue the dietary protocol until you meet with the dietitian at your 2-week visit     Strive to reach protein goal. Only liquids counted toward your protein goal are protein shake, milk and approved yogurt     It is MANDATORY that you do not progress your diet without speaking to the dietitian to prevent potential complications          Incisional Care     Wash your hands before you touch your incisions or dressings     Remove any gauze or dressing over your incision. ONLY steri-strips (butter-fly band aids) should remain over your incision     Shower daily with an anti-bacterial soap (Hibiclens or Dial, orange bar).      Allow water to hit your back in the shower     Wet the incisions with water     Apply soap to a clean washcloth and wash over your incisions (do not scrub)     Rinse your incision with water and pat dry with a clean towel      Check your incisions daily for any redness, swelling, hot to touch, or bright red, green or yellow drainage.             Dark red, dried blood, indention of an incision, bruising may appear under the steri-strips. This is normal.     Do not apply any creams, ointments, etc. on the incisions.      Leave incision open to air (unless instructed otherwise)          Activity     Walk and/or ride a stationary bike 20 minutes a day     Do not lift, pull or push anything greater than 10 pounds for 4-6 weeks     Do not go the gym until you are  4-6 weeks post-op     Use your incentive spirometer (breathing machine) 10 x an hour for 1-2 weeks     Shower daily, DO NOT submerge yourself in water until 2 weeks post-op and cleared by surgeon          Post-Operative Expectations     A soreness is to be expected. Pain differs for everyone. Please refer to the pain scale and list of when to call the doctor regarding pain.     Nausea can last a few weeks after surgery due to the body getting adjusted to the new small stomach. Take anti-nausea as needed and stay HYDRATED. Slight dehydration will cause nausea.     Constipation is common after surgery. Take MiraLAX daily even if your bowel movements are regular. Please refer to the FAQs regarding constipation. Water is essential in preventing constipation.

## 2025-06-19 ENCOUNTER — PATIENT MESSAGE (OUTPATIENT)
Dept: SURGERY | Facility: HOSPITAL | Age: 30
End: 2025-06-19
Payer: COMMERCIAL

## 2025-06-19 LAB — PSYCHE PATHOLOGY RESULT: NORMAL

## 2025-06-20 ENCOUNTER — TELEPHONE (OUTPATIENT)
Dept: BARIATRICS | Facility: HOSPITAL | Age: 30
End: 2025-06-20
Payer: COMMERCIAL

## 2025-06-20 ENCOUNTER — PATIENT MESSAGE (OUTPATIENT)
Dept: SURGERY | Facility: HOSPITAL | Age: 30
End: 2025-06-20
Payer: COMMERCIAL

## 2025-06-20 DIAGNOSIS — Z98.84 HX OF BARIATRIC SURGERY: Primary | ICD-10-CM

## 2025-06-20 NOTE — TELEPHONE ENCOUNTER
Spoke with pt- struggling with water intake and muscle spasms. Drank 36 oz water total yesterday. Muscle spasms almost completely gone today. Advised tips to help increase water intake and prevent muscle spasms:     1) Taking smaller sips  2) Switching to room temperature water  3) Sipping every 5 minutes to reach goal of 50 or more oz of water  4) Staying off protein over weekend and adding back into regimen when able to reach water goal consistently  5) Adding flavor or gatorade0 to fluid intake to help reach goal

## 2025-06-23 ENCOUNTER — TELEPHONE (OUTPATIENT)
Dept: BARIATRICS | Facility: HOSPITAL | Age: 30
End: 2025-06-23
Payer: COMMERCIAL

## 2025-06-23 DIAGNOSIS — Z98.84 HX OF BARIATRIC SURGERY: Primary | ICD-10-CM

## 2025-06-23 NOTE — TELEPHONE ENCOUNTER
Date call was completed: 6/23/25  Date of Surgery: 6/17/25  Surgery type: sleeve gastrectomy    Are you drinking the recommended amount of water (73-100oz) a day? [x]  Yes        []  No  If not, how may ounces of water are you drinking? 70-80 oz    Are you drinking the recommended amount of protein a day?(recommendations base on individual goal) []  Yes        [x]  No  If not, how many grams of protein are you drinking? 42 g    Are you taking the recommended supplements that were discussed in pre-op class?  [x]  Yes   [] No  Multivitamin [x]  Yes        []  No  Calcium Supplement [x]  Yes        []  No  Iron [x]  Yes        []  No  Miralax [x]  Yes        []  No- told to stop miralax due to diarrhea     Are you walking at least 20 minutes a day for exercise? [x]  Yes   [] No    Have you resumed your home medications that you were instructed to resume? []  Yes   [x] No- restarts in 30 days post op     Do you have a follow-up appt scheduled with your family doctor or doctor treating you for you co-morb conditions within the week? [x]  Yes   [] No                 - sees PCP in next week     Are you taking the Protonix (at night) that was prescribed to you in the hospital? [x]  Yes   [] No , prescribed twice daily    Are you showering daily with an antibacterial soap?  [x]  Yes   [] No    Have you had a bowel movement since surgery? [x]  Yes   [] No

## 2025-06-25 ENCOUNTER — PATIENT MESSAGE (OUTPATIENT)
Dept: SURGERY | Facility: HOSPITAL | Age: 30
End: 2025-06-25
Payer: COMMERCIAL

## 2025-06-25 NOTE — PROGRESS NOTES
"Progress Note  General Surgery    Patient Name: Perla Vu  YOB: 1995    Date: 2025                     PRESENTING HISTORY:     Chief Complaint/Reason for Admission: "post op appointment"    History of Present Illness:  Ms. Perla Vu is a 29 y.o. female for 2 week post op VSG. Pt without any complaints.  Tolerating liquid diet without any nausea.  Denies any fever / chills. Walking, taking vitamins as directed.        Review of Systems:  12 point ROS negative except as stated in HPI    PAST HISTORY:     Past Medical History:   Diagnosis Date    Allergy     Seasonal allergies and skin allergies    Anxiety     Controlled    Asthma     Eosinophilic esophagitis     GERD (gastroesophageal reflux disease)     Migraines     Obesity     Papilledema     Pseudotumor cerebri        Past Surgical History:   Procedure Laterality Date    ADENOIDECTOMY       SECTION       SECTION       SECTION WITH TUBAL LIGATION      ESOPHAGEAL DILATION      LAPAROSCOPIC SLEEVE GASTRECTOMY N/A 2025    Procedure: GASTRECTOMY, SLEEVE, LAPAROSCOPIC;  Surgeon: Miky Whitehead Jr., MD;  Location: [facility];  Service: General;  Laterality: N/A;  Laparoscopic Vertical Sleeve Gastrectomy    LUMBAR PUNCTURE      TUBAL LIGATION      WISDOM TOOTH EXTRACTION         Family History   Problem Relation Name Age of Onset    Aneurysm Paternal Grandfather Grandfather     Obesity Paternal Grandfather Grandfather     Hypertension Mother Mona     Obesity Father Gene     Stroke Maternal Grandfather Kaiden     Cancer Maternal Grandmother Isabel     Obesity Maternal Grandmother Isabel        Social History     Socioeconomic History    Marital status:    Tobacco Use    Smoking status: Never    Smokeless tobacco: Never   Vaping Use    Vaping status: Never Used   Substance and Sexual Activity    Alcohol use: Not Currently    Drug use: Never    Sexual activity: Yes "     Partners: Male     Birth control/protection: Other-see comments, None     Comment: Tubes tied     Social Drivers of Health     Financial Resource Strain: Low Risk  (6/24/2025)    Received from [facility]    Overall Financial Resource Strain (CARDIA)     How hard is it for you to pay for the very basics like food, housing, medical care, and heating?: Not hard at all   Food Insecurity: No Food Insecurity (6/24/2025)    Received from [facility]    Hunger Vital Sign     Worried About Running Out of Food in the Last Year: Never true     Ran Out of Food in the Last Year: Never true   Transportation Needs: No Transportation Needs (6/24/2025)    Received from [facility]    PRAPARE - Transportation     In the past 12 months, has lack of transportation kept you from medical appointments or from getting medications?: No     In the past 12 months, has lack of transportation kept you from meetings, work, or from getting things needed for daily living?: No   Physical Activity: Insufficiently Active (6/24/2025)    Received from [facility]    Exercise Vital Sign     Days of Exercise per Week: 3 days     Minutes of Exercise per Session: 30 min   Stress: No Stress Concern Present (6/17/2025)    [place] [employer] [place] [employer] - Occupational Stress Questionnaire     Feeling of Stress : Only a little   Housing Stability: Unknown (6/24/2025)    Received from [facility]    Housing Stability Vital Sign     Unable to Pay for Housing in the Last Year: No     Homeless in the Last Year: No       MEDICATIONS & ALLERGIES:     Current Outpatient Medications on File Prior to Visit   Medication Sig    albuterol-budesonide (AIRSUPRA) 90-80 mcg/actuation Inhale 2 puffs into the lungs as needed.    busPIRone (BUSPAR) 5 MG Tab Take 5 mg by mouth as needed.    dupilumab (DUPIXENT PEN) 300 mg/2 mL PnIj Inject 300 mg  "into the skin every 14 (fourteen) days. Once a week injection    esomeprazole (NEXIUM) 20 MG capsule Take 20 mg by mouth as needed. (Patient not taking: Reported on 6/30/2025)    ondansetron (ZOFRAN-ODT) 4 MG TbDL Take 1 tablet (4 mg total) by mouth every 6 (six) hours as needed (nausea).    pantoprazole (PROTONIX) 40 MG tablet Take 40 mg by mouth nightly.    traMADoL (ULTRAM) 50 mg tablet Take 1 tablet (50 mg total) by mouth every 6 (six) hours as needed for Pain.     No current facility-administered medications on file prior to visit.       Review of patient's allergies indicates:   Allergen Reactions    Gluten protein Hives    Codeine Nausea And Vomiting       OBJECTIVE:     Vital Signs:  Visit Vitals  /84   Pulse 85   Ht 5' 6" (1.676 m)   Wt 114 kg (251 lb 6.4 oz)   LMP 06/17/2025 (Exact Date)   BMI 40.58 kg/m²       Physical Exam:  General:  Well developed, well nourished, no acute distress  HEENT:  Normocephalic, atraumatic, PERRL, EOMI, clear sclera, ears normal, neck supple, throat clear without erythema or exudates  CVS:  RRR, S1 and S2 normal, no murmurs, rubs, gallops  Resp:  Lungs clear to auscultation, no wheezes, rales, rhonchi, cough  GI:  Abdomen soft, non-tender, non-distended, normoactive bowel sounds, no masses  :  Deferred  MSK:  No muscle atrophy, cyanosis, peripheral edema, full range of motion  Skin:  No rashes, ulcers, erythema  Neuro:  CNII-XII grossly intact  Psych:  Alert and oriented to person, place, and time        ASSESSMENT & PLAN:   Ms. Perla Vu is a 29 y.o. female 2 week s/p Laparoscopic Vertical Sleeve Gastrectomy    VISIT DIAGNOSES:       ICD-10-CM ICD-9-CM   1. Electrolyte imbalance risk  Z91.89 V49.89   2. Screening for iron deficiency anemia  Z13.0 V78.0   3. Encounter for vitamin deficiency screening  Z13.21 V77.99       Plan:  Starting Weight 269  2 Week Weight 251      -  Pt doing well  -  Discussed exercise goals at this time  -  F/U with Bariatric team   - "  advance to Pureed food    RTC 6 wks with labs    Miky Whitehead Jr, MD  General / Bariatric Surgery

## 2025-06-30 ENCOUNTER — OFFICE VISIT (OUTPATIENT)
Dept: SURGERY | Facility: CLINIC | Age: 30
End: 2025-06-30
Payer: COMMERCIAL

## 2025-06-30 ENCOUNTER — CLINICAL SUPPORT (OUTPATIENT)
Dept: BARIATRICS | Facility: HOSPITAL | Age: 30
End: 2025-06-30

## 2025-06-30 ENCOUNTER — PATIENT MESSAGE (OUTPATIENT)
Dept: BARIATRICS | Facility: HOSPITAL | Age: 30
End: 2025-06-30

## 2025-06-30 VITALS
HEIGHT: 66 IN | HEART RATE: 85 BPM | WEIGHT: 251.38 LBS | BODY MASS INDEX: 40.4 KG/M2 | DIASTOLIC BLOOD PRESSURE: 84 MMHG | SYSTOLIC BLOOD PRESSURE: 120 MMHG

## 2025-06-30 DIAGNOSIS — Z91.89 ELECTROLYTE IMBALANCE RISK: Primary | ICD-10-CM

## 2025-06-30 DIAGNOSIS — Z13.0 SCREENING FOR IRON DEFICIENCY ANEMIA: ICD-10-CM

## 2025-06-30 DIAGNOSIS — Z98.84 HX OF BARIATRIC SURGERY: Primary | ICD-10-CM

## 2025-06-30 DIAGNOSIS — Z13.21 ENCOUNTER FOR VITAMIN DEFICIENCY SCREENING: ICD-10-CM

## 2025-06-30 PROCEDURE — 3074F SYST BP LT 130 MM HG: CPT | Mod: CPTII,,, | Performed by: SURGERY

## 2025-06-30 PROCEDURE — 3044F HG A1C LEVEL LT 7.0%: CPT | Mod: CPTII,,, | Performed by: SURGERY

## 2025-06-30 PROCEDURE — 99024 POSTOP FOLLOW-UP VISIT: CPT | Mod: ,,, | Performed by: SURGERY

## 2025-06-30 PROCEDURE — 3079F DIAST BP 80-89 MM HG: CPT | Mod: CPTII,,, | Performed by: SURGERY

## 2025-06-30 PROCEDURE — 1159F MED LIST DOCD IN RCRD: CPT | Mod: CPTII,,, | Performed by: SURGERY

## 2025-06-30 PROCEDURE — 1160F RVW MEDS BY RX/DR IN RCRD: CPT | Mod: CPTII,,, | Performed by: SURGERY

## 2025-06-30 NOTE — PROGRESS NOTES
"POST OP BARIATRIC NUTRITION FOLLOW UP    Type of surgery: sleeve gastrectomy  Post-op visit:   [x] 2 weeks  [] 4 weeks:  [] 2 months:  [] 4 months:  [] 6 months:  [] 9 months:  [] 1 year:   [] Other:     Height:   Ht Readings from Last 1 Encounters:   06/17/25 5' 6" (1.676 m)      Weight:   Wt Readings from Last 3 Encounters:   06/17/25 1722 122.4 kg (269 lb 13.5 oz)   06/10/25 1318 120.7 kg (266 lb)   06/11/25 1610 122.4 kg (269 lb 14.4 oz)   06/06/25 0946 121.5 kg (267 lb 14.4 oz)      BMI:   BMI Readings from Last 1 Encounters:   06/17/25 43.55 kg/m²       Post op complications:   [] Yes [x] No  If yes: [] Nausea   [] Vomiting   [] Constipation    [] Diarrhea    [] Other:     Dietary tolerance:  [x] Yes [] No [] Comment:     Adequate fluid intake:  [x] Yes [] No Approx. daily fluid intake: 100 oz or more daily  Adequate protein intake:  [] Yes [x] No  Approx. daily protein intake: advised pt to cap water at  oz daily in order to save room for protein or add clear protein to 1-2 of ji    Vitamins/Minerals:   [x] MVI:    [] Biotin:  [x] Calcium:    [] Hair/Nails:  [] B 50 complex:   [] Bariatric Specific MVI:  [] B12:    [] Bariatric Specific Calcium:  [x] Iron:    [] Other:   [] Non-compliance:      Expected compliance:  [x]Good  []Fair  []Poor    Progress:   [x]Patient progressing well at this time with no complaints   [] Patient expressed complaint at this time: See additional notes     Bariatric Diet Education:   Verbalizes understanding Demonstrates  Needs further teaching Needs practice/ supervision Comments    Bariatric Clear [] [] [] []    Bariatric Full [] [] [] []    Bariatric Puree [x] [] [] []    Bariatric Soft [x] [] [] []    Bariatric Regular [] [] [] []    Bariatric Reintroduction of Starchy CHO [] [] [] []    Bariatric: Mindful Eating [x] [] [] []    Importance of Protein and Vitamin Protocol [x] [] [] []    Other:          Additional Notes:   Pt is doing well with daily water and vitamin " regimen. Is drinking over 100 oz daily and not having enough room for protein intake- says does not like protein shakes anymore. Sent her multiple links to order clear protein options or what stores to find them in. Told her to limit water intake to  oz in order to make room for more protein. Educated her on pureed diet and soft food diet- advised to restrict starches in diet until 6 months post op.

## 2025-07-18 ENCOUNTER — PATIENT MESSAGE (OUTPATIENT)
Dept: SURGERY | Facility: HOSPITAL | Age: 30
End: 2025-07-18
Payer: COMMERCIAL

## 2025-07-28 ENCOUNTER — PATIENT MESSAGE (OUTPATIENT)
Dept: SURGERY | Facility: CLINIC | Age: 30
End: 2025-07-28
Payer: COMMERCIAL

## 2025-08-20 ENCOUNTER — OFFICE VISIT (OUTPATIENT)
Dept: SURGERY | Facility: CLINIC | Age: 30
End: 2025-08-20
Payer: COMMERCIAL

## 2025-08-20 ENCOUNTER — CLINICAL SUPPORT (OUTPATIENT)
Dept: BARIATRICS | Facility: HOSPITAL | Age: 30
End: 2025-08-20

## 2025-08-20 VITALS
WEIGHT: 231 LBS | HEIGHT: 66 IN | HEIGHT: 66 IN | BODY MASS INDEX: 37.12 KG/M2 | WEIGHT: 231 LBS | BODY MASS INDEX: 37.12 KG/M2

## 2025-08-20 DIAGNOSIS — Z13.0 SCREENING FOR IRON DEFICIENCY ANEMIA: ICD-10-CM

## 2025-08-20 DIAGNOSIS — E55.9 VITAMIN D DEFICIENCY: ICD-10-CM

## 2025-08-20 DIAGNOSIS — Z13.21 ENCOUNTER FOR VITAMIN DEFICIENCY SCREENING: ICD-10-CM

## 2025-08-20 DIAGNOSIS — Z98.84 HISTORY OF BARIATRIC SURGERY: Primary | ICD-10-CM

## 2025-08-20 DIAGNOSIS — Z91.89 ELECTROLYTE IMBALANCE RISK: Primary | ICD-10-CM

## 2025-08-20 PROCEDURE — 99024 POSTOP FOLLOW-UP VISIT: CPT | Mod: ,,,

## 2025-08-20 PROCEDURE — 3044F HG A1C LEVEL LT 7.0%: CPT | Mod: CPTII,,,

## 2025-08-20 PROCEDURE — 1159F MED LIST DOCD IN RCRD: CPT | Mod: CPTII,,,

## 2025-08-20 RX ORDER — MULTIVIT WITH IRON,MINERALS
TABLET,CHEWABLE ORAL
COMMUNITY

## 2025-08-20 RX ORDER — LANOLIN ALCOHOL/MO/W.PET/CERES
1 CREAM (GRAM) TOPICAL
COMMUNITY

## 2025-08-20 RX ORDER — CALCIUM CARBONATE 500(1250)
1 TABLET ORAL DAILY
COMMUNITY

## 2025-08-22 ENCOUNTER — PATIENT MESSAGE (OUTPATIENT)
Dept: SURGERY | Facility: CLINIC | Age: 30
End: 2025-08-22
Payer: COMMERCIAL

## (undated) DEVICE — POSITIONER HEEL FOAM CONVOLTD

## (undated) DEVICE — RELOAD ECHELON FLEX BLU 60MM

## (undated) DEVICE — NDL HYPO 22GX1 1/2 SYR 10ML LL

## (undated) DEVICE — IRRIGATOR SUCTION W/TIP

## (undated) DEVICE — DRAPE SLUSH WARMER 66X44IN

## (undated) DEVICE — KIT SURGICAL TURNOVER

## (undated) DEVICE — APPLICATOR VISTASEAL RIG 35CM

## (undated) DEVICE — SUT VICRYL PLUS 4-0 FS-2 27IN

## (undated) DEVICE — CLIP ENDO LIGATION LARGE CLIPS

## (undated) DEVICE — GLOVE PROTEXIS LTX MICRO 6.5

## (undated) DEVICE — CHLORAPREP W TINT 26ML APPL

## (undated) DEVICE — STAPLER ECHELON LONG 60X440MM

## (undated) DEVICE — NDL GRANEE OPEN LOOP GRASPER

## (undated) DEVICE — RELOAD ECHELON FLEX GRN 60MM

## (undated) DEVICE — TROCAR ENDOPATH XCEL 15MM 10CM

## (undated) DEVICE — CUSHION  WC FOAM 20X20X.75IN

## (undated) DEVICE — GLOVE PROTEXIS LTX MICRO  7.5

## (undated) DEVICE — ELECTRODE PATIENT RETURN DISP

## (undated) DEVICE — STRIP MEDI WND CLSR 1/2X4IN

## (undated) DEVICE — SUT VICRYL+ 27 UR-6 VIOL

## (undated) DEVICE — KIT GEN LAPAROSCOPY LAFAYETTE

## (undated) DEVICE — SOL IRRI STRL WATER 1000ML

## (undated) DEVICE — PAD PREP CUFFED NS 24X48IN

## (undated) DEVICE — SCISSOR CURVED ENDOPATH 5MM

## (undated) DEVICE — HOLDER STRIP-T SELF ADH 2X10IN

## (undated) DEVICE — TROCAR ENDOPATH XCEL 11MM 10CM

## (undated) DEVICE — COVER MAYO STND XL 30X57IN

## (undated) DEVICE — SHEARS HS LONG 5MM CURVED

## (undated) DEVICE — BINDER ABD 12IN MED/LG 46-62IN

## (undated) DEVICE — TROCAR ENDOPATH XCEL 5X100MM

## (undated) DEVICE — GLOVE PROTEXIS HYDROGEL SZ7.5

## (undated) DEVICE — SEALANT VISTASEAL FIBRIN 10ML

## (undated) DEVICE — CANNULA ENDOPATH XCEL 5X100MM

## (undated) DEVICE — APPLICATOR CHLORAPREP ORN 26ML

## (undated) DEVICE — MATTRESS HOVERMAT SPLIT TRNSF